# Patient Record
Sex: MALE | Race: WHITE | Employment: FULL TIME | URBAN - METROPOLITAN AREA
[De-identification: names, ages, dates, MRNs, and addresses within clinical notes are randomized per-mention and may not be internally consistent; named-entity substitution may affect disease eponyms.]

---

## 2018-01-08 ENCOUNTER — TELEPHONE (OUTPATIENT)
Dept: GASTROENTEROLOGY | Age: 53
End: 2018-01-08

## 2018-01-08 DIAGNOSIS — Z12.11 COLON CANCER SCREENING: Primary | ICD-10-CM

## 2018-01-10 PROBLEM — Z12.11 COLON CANCER SCREENING: Status: ACTIVE | Noted: 2018-01-10

## 2018-01-10 RX ORDER — SODIUM, POTASSIUM,MAG SULFATES 17.5-3.13G
SOLUTION, RECONSTITUTED, ORAL ORAL
Qty: 2 BOTTLE | Refills: 0 | Status: ON HOLD | OUTPATIENT
Start: 2018-01-10 | End: 2018-01-29 | Stop reason: HOSPADM

## 2018-01-17 ENCOUNTER — TELEPHONE (OUTPATIENT)
Dept: GASTROENTEROLOGY | Age: 53
End: 2018-01-17

## 2018-01-29 ENCOUNTER — HOSPITAL ENCOUNTER (OUTPATIENT)
Age: 53
Setting detail: OUTPATIENT SURGERY
Discharge: HOME | End: 2018-01-29
Attending: INTERNAL MEDICINE | Admitting: INTERNAL MEDICINE
Payer: COMMERCIAL

## 2018-01-29 ENCOUNTER — ANESTHESIA EVENT (OUTPATIENT)
Dept: OPERATING ROOM | Age: 53
End: 2018-01-29
Payer: COMMERCIAL

## 2018-01-29 ENCOUNTER — ANESTHESIA (OUTPATIENT)
Dept: OPERATING ROOM | Age: 53
End: 2018-01-29
Payer: COMMERCIAL

## 2018-01-29 VITALS
RESPIRATION RATE: 17 BRPM | SYSTOLIC BLOOD PRESSURE: 107 MMHG | OXYGEN SATURATION: 97 % | DIASTOLIC BLOOD PRESSURE: 67 MMHG

## 2018-01-29 VITALS
OXYGEN SATURATION: 99 % | DIASTOLIC BLOOD PRESSURE: 76 MMHG | RESPIRATION RATE: 16 BRPM | BODY MASS INDEX: 23.62 KG/M2 | HEART RATE: 63 BPM | HEIGHT: 75 IN | SYSTOLIC BLOOD PRESSURE: 106 MMHG | WEIGHT: 190 LBS | TEMPERATURE: 97.1 F

## 2018-01-29 PROCEDURE — 2580000003 HC RX 258: Performed by: INTERNAL MEDICINE

## 2018-01-29 PROCEDURE — 6360000002 HC RX W HCPCS: Performed by: NURSE ANESTHETIST, CERTIFIED REGISTERED

## 2018-01-29 PROCEDURE — 2500000003 HC RX 250 WO HCPCS: Performed by: NURSE ANESTHETIST, CERTIFIED REGISTERED

## 2018-01-29 PROCEDURE — 3700000001 HC ADD 15 MINUTES (ANESTHESIA): Performed by: INTERNAL MEDICINE

## 2018-01-29 PROCEDURE — 7100000010 HC PHASE II RECOVERY - FIRST 15 MIN: Performed by: INTERNAL MEDICINE

## 2018-01-29 PROCEDURE — 7100000011 HC PHASE II RECOVERY - ADDTL 15 MIN: Performed by: INTERNAL MEDICINE

## 2018-01-29 PROCEDURE — 3700000000 HC ANESTHESIA ATTENDED CARE: Performed by: INTERNAL MEDICINE

## 2018-01-29 PROCEDURE — 45378 DIAGNOSTIC COLONOSCOPY: CPT | Performed by: INTERNAL MEDICINE

## 2018-01-29 PROCEDURE — 3609027000 HC COLONOSCOPY: Performed by: INTERNAL MEDICINE

## 2018-01-29 RX ORDER — OMEPRAZOLE 10 MG/1
10 CAPSULE, DELAYED RELEASE ORAL EVERY MORNING
COMMUNITY

## 2018-01-29 RX ORDER — LIDOCAINE HYDROCHLORIDE 10 MG/ML
INJECTION, SOLUTION INFILTRATION; PERINEURAL PRN
Status: DISCONTINUED | OUTPATIENT
Start: 2018-01-29 | End: 2018-01-29 | Stop reason: SDUPTHER

## 2018-01-29 RX ORDER — BUPROPION HYDROCHLORIDE 100 MG/1
300 TABLET ORAL EVERY MORNING
COMMUNITY

## 2018-01-29 RX ORDER — PROPOFOL 10 MG/ML
INJECTION, EMULSION INTRAVENOUS CONTINUOUS PRN
Status: DISCONTINUED | OUTPATIENT
Start: 2018-01-29 | End: 2018-01-29 | Stop reason: SDUPTHER

## 2018-01-29 RX ORDER — SODIUM CHLORIDE, SODIUM LACTATE, POTASSIUM CHLORIDE, CALCIUM CHLORIDE 600; 310; 30; 20 MG/100ML; MG/100ML; MG/100ML; MG/100ML
INJECTION, SOLUTION INTRAVENOUS CONTINUOUS
Status: DISCONTINUED | OUTPATIENT
Start: 2018-01-29 | End: 2018-01-29 | Stop reason: HOSPADM

## 2018-01-29 RX ADMIN — SODIUM CHLORIDE, POTASSIUM CHLORIDE, SODIUM LACTATE AND CALCIUM CHLORIDE: 600; 310; 30; 20 INJECTION, SOLUTION INTRAVENOUS at 08:55

## 2018-01-29 RX ADMIN — PROPOFOL 140 MCG/KG/MIN: 10 INJECTION, EMULSION INTRAVENOUS at 09:31

## 2018-01-29 RX ADMIN — LIDOCAINE HYDROCHLORIDE 60 MG: 10 INJECTION, SOLUTION INFILTRATION; PERINEURAL at 09:31

## 2018-01-29 ASSESSMENT — PAIN SCALES - GENERAL
PAINLEVEL_OUTOF10: 0

## 2018-01-29 NOTE — ANESTHESIA PRE PROCEDURE
Pulse: 67   Resp: 16   Temp: 36.4 °C (97.6 °F)   TempSrc: Temporal   SpO2: 96%   Weight: 190 lb (86.2 kg)   Height: 6' 3\" (1.905 m)                                              BP Readings from Last 3 Encounters:   01/29/18 (!) 125/95   04/27/15 122/79       NPO Status: Time of last liquid consumption: 0430                        Time of last solid consumption: 1030                        Date of last liquid consumption: 01/29/18                        Date of last solid food consumption: 01/28/18    BMI:   Wt Readings from Last 3 Encounters:   01/29/18 190 lb (86.2 kg)   04/27/15 185 lb (83.9 kg)   04/23/15 185 lb (83.9 kg)     Body mass index is 23.75 kg/m². CBC:   Lab Results   Component Value Date    WBC 8.0 06/09/2016    RBC 5.04 06/09/2016    HGB 14.8 06/09/2016    HCT 45.2 06/09/2016    MCV 89.8 06/09/2016    RDW 14.3 06/09/2016     06/09/2016       CMP:   Lab Results   Component Value Date     04/21/2015    K 4.3 04/21/2015     04/21/2015    CO2 29 04/21/2015    BUN 18 04/21/2015    CREATININE 1.10 04/21/2015    GFRAA >60 04/21/2015    LABGLOM >60 04/21/2015    GLUCOSE 93 04/21/2015    CALCIUM 8.9 04/21/2015       POC Tests: No results for input(s): POCGLU, POCNA, POCK, POCCL, POCBUN, POCHEMO, POCHCT in the last 72 hours.     Coags: No results found for: PROTIME, INR, APTT    HCG (If Applicable): No results found for: PREGTESTUR, PREGSERUM, HCG, HCGQUANT     ABGs: No results found for: PHART, PO2ART, YEG3ZFE, TNV4IUT, BEART, H5ALYHBX     Type & Screen (If Applicable):  No results found for: LABABO, LABRH    Anesthesia Evaluation   no history of anesthetic complications:   Airway: Mallampati: II  TM distance: >3 FB   Neck ROM: full  Mouth opening: > = 3 FB Dental: normal exam         Pulmonary:normal exam  breath sounds clear to auscultation  (+) sleep apnea: on noncompliant,                             Cardiovascular:  Exercise tolerance: good (>4 METS),

## 2018-01-29 NOTE — H&P
History and Physical    Patient's Name/Date of Birth: Krista Leahy / 1965 (50 y.o.)    Date: January 29, 2018     CHIEF COMPLAINT:  screening for colon cancer     Past Medical History:   Diagnosis Date    Arthritis     Depression     Reflux     Sleep apnea      Past Surgical History:   Procedure Laterality Date    FOOT SURGERY Right     toenail removed great toe    KNEE ARTHROSCOPY Left 4/27/15    VASECTOMY      WISDOM TOOTH EXTRACTION       Current Facility-Administered Medications   Medication Dose Route Frequency Provider Last Rate Last Dose    lactated ringers infusion   Intravenous Continuous Claude Poll,  mL/hr at 01/29/18 0855       No Known Allergies  History reviewed. No pertinent family history. Social History     Social History    Marital status:      Spouse name: N/A    Number of children: N/A    Years of education: N/A     Occupational History    Not on file. Social History Main Topics    Smoking status: Current Some Day Smoker     Packs/day: 1.00     Types: Cigars    Smokeless tobacco: Never Used      Comment: every couple days    Alcohol use No    Drug use: Unknown    Sexual activity: Not on file     Other Topics Concern    Not on file     Social History Narrative    No narrative on file     ROS: Non-contributory    Physical Exam:  Vitals:    01/29/18 0845   BP: (!) 125/95   Pulse: 67   Resp: 16   Temp: 97.6 °F (36.4 °C)   SpO2: 96%       Chest: Breath sounds were clear and equal with no rales, wheezes, or rhonchi. Respiratory effort was normal with no retractions or use of accessory muscles. Cardiovascular: Heart sounds were normal with a regular rate and rhythm. There were no murmurs, gallops or rubs. Abdomen: Bowel sounds were normal.  The abdomen was soft and non distended. There was no tenderness, guarding, rebound, or rigidity. There were no masses, hepatosplenomegaly, or hernias.     Plan: Colonoscopy      Electronically by Nicolette Brasher
Detail Level: Detailed
Quality 110: Preventive Care And Screening: Influenza Immunization: Influenza Immunization Administered during Influenza season

## 2018-04-11 PROBLEM — Z12.11 COLON CANCER SCREENING: Status: RESOLVED | Noted: 2018-01-10 | Resolved: 2018-04-11

## 2021-05-21 ENCOUNTER — HOSPITAL ENCOUNTER (OUTPATIENT)
Dept: LAB | Age: 56
Setting detail: SPECIMEN
Discharge: HOME | End: 2021-05-21
Payer: COMMERCIAL

## 2021-05-21 DIAGNOSIS — Z01.818 PREOPERATIVE TESTING: Primary | ICD-10-CM

## 2021-05-21 DIAGNOSIS — Z01.818 PREOPERATIVE TESTING: ICD-10-CM

## 2021-05-21 PROCEDURE — C9803 HOPD COVID-19 SPEC COLLECT: HCPCS

## 2021-05-21 PROCEDURE — U0003 INFECTIOUS AGENT DETECTION BY NUCLEIC ACID (DNA OR RNA); SEVERE ACUTE RESPIRATORY SYNDROME CORONAVIRUS 2 (SARS-COV-2) (CORONAVIRUS DISEASE [COVID-19]), AMPLIFIED PROBE TECHNIQUE, MAKING USE OF HIGH THROUGHPUT TECHNOLOGIES AS DESCRIBED BY CMS-2020-01-R: HCPCS

## 2021-05-21 PROCEDURE — U0005 INFEC AGEN DETEC AMPLI PROBE: HCPCS

## 2021-05-22 LAB
SARS-COV-2: NORMAL
SARS-COV-2: NOT DETECTED
SOURCE: NORMAL

## 2021-05-23 ENCOUNTER — TELEPHONE (OUTPATIENT)
Dept: PRIMARY CARE CLINIC | Age: 56
End: 2021-05-23

## 2021-05-27 ENCOUNTER — HOSPITAL ENCOUNTER (OUTPATIENT)
Age: 56
Discharge: HOME | End: 2021-05-29
Payer: COMMERCIAL

## 2021-05-27 ENCOUNTER — HOSPITAL ENCOUNTER (OUTPATIENT)
Dept: PULMONOLOGY | Age: 56
Discharge: HOME | End: 2021-05-27
Payer: COMMERCIAL

## 2021-05-27 ENCOUNTER — HOSPITAL ENCOUNTER (OUTPATIENT)
Dept: GENERAL RADIOLOGY | Age: 56
Discharge: HOME | End: 2021-05-29
Payer: COMMERCIAL

## 2021-05-27 DIAGNOSIS — R06.02 BREATH SHORTNESS: ICD-10-CM

## 2021-05-27 DIAGNOSIS — R06.00 DYSPNEA, UNSPECIFIED TYPE: ICD-10-CM

## 2021-05-27 PROCEDURE — 94726 PLETHYSMOGRAPHY LUNG VOLUMES: CPT

## 2021-05-27 PROCEDURE — 94664 DEMO&/EVAL PT USE INHALER: CPT

## 2021-05-27 PROCEDURE — 71046 X-RAY EXAM CHEST 2 VIEWS: CPT

## 2021-05-27 PROCEDURE — 94060 EVALUATION OF WHEEZING: CPT

## 2021-05-27 PROCEDURE — 94729 DIFFUSING CAPACITY: CPT

## 2021-05-27 PROCEDURE — 6370000000 HC RX 637 (ALT 250 FOR IP): Performed by: INTERNAL MEDICINE

## 2021-05-27 RX ORDER — ALBUTEROL SULFATE 90 UG/1
4 AEROSOL, METERED RESPIRATORY (INHALATION) ONCE
Status: COMPLETED | OUTPATIENT
Start: 2021-05-27 | End: 2021-05-27

## 2021-05-27 RX ADMIN — ALBUTEROL SULFATE 4 PUFF: 90 AEROSOL, METERED RESPIRATORY (INHALATION) at 07:53

## 2021-06-09 NOTE — PROCEDURES
PULMONARY FUNCTION TEST      DATE 2021    COMPONENTS  Following components of the pulmonary function tests were performed during this study:    [x] Spirometry with the Forced Vital Capacity maneuver   [x] With pre-and post- bronchodilator challenge  [] Without pre-and post- bronchodilator challenge  [x] Flow-volume loop  [x] Lung volumes (Body plethysmography, when applicable)  [x] Airway resistance  [x] Diffusion capacity  [] Resting oxygen saturation  [] Maximum inspiratory and expiratory pressures    QUALITY  Based on technician observations and review of the raw data, shows that the study is of  [x] Good quality and seem to reflect true performance capacity as  [x] Patient demonstrated good effort and cooperation  [x] Test met the ATS standard for acceptability and repeatability    [] Marginal or poor quality and may not reflect true performance capacity as  [] ATS standard of \"END OF TEST\" was not met  [] Patient demonstrated hesitancy, poor effort and cooperation  [] Patient was unable to keep mouth closed on mouthpiece to maintain seal  [] Unable to perform body plethysmography  [] Unable to perform DLCO maneuver    MEASUREMENTS  FVC: 5.01 L (85% predicted), decreased to 4.93 L (84% predicted), -1% change after bronchodilator  FEV1: 3.76 L (83% predicted), improved to 3.96 L (88%), +5% after bronchodilator  FEV1/FVC: 80%  T.81 L (85% predicted)  RV: 1.77 L (74% predicted)  RV/T%  DLCO: 39.07 mL/min/mmHg (92% predicted)    SPIROMETRY       [x]   NORMAL     []   OBSTRUCTIVE VENTILATORY IMPAIRMENT     []   SUGGESTS RESTRICTIVE IMPAIRMENT       []   SUGGESTS SMALL AIRWAY DISEASE     BRONCHODILATOR RESPONSE    [x]   No significant response    < 8% improvement in FEV1     []   Borderline response  9 to 11% improvement in FEV1   or    at least 200 mL increase in FEV1     []   Significant response  > 11% improvement in FEV1   and    at least 200 mL increase in FEV1     FLOW-VOLUME LOOP PATTERN    [x]   NORMAL     []   OBSTRUCTIVE      []   RESTRICTIVE      []   VARIABLE- EXTRATHORACIC     []   VARIABLE- INTRATHORACIC     []   FIXED OBSTRUCTIVE      []   SAWTOOTH      LUNG VOLUMES/BODY PLETHYSMOGRAPHY     TOTAL LUNG CAPACITY  [x] NORMAL    [] AT LOWER LIMIT OF NORMAL    [] REDUCED (CONFIRMS RESTRICTION) < 80 % predicted   [] INCREASED (SUGGESTIVE OF HYPERINFLATION) > 120 % predicted   [] UNABLE TO PERFORM      RESIDUAL VOLUME or RV/TLC  [] NORMAL    [x] AT LOWER LIMIT OF NORMAL    [] REDUCED (CONFIRMS RESTRICTION) < 80%   [] INCREASED (SUGGESTIVE OF AIR TRAPPING) > 120 %     EXPIRATORY RESERVE VOLUME  [x] NORMAL   [] REDUCED (CONSISTENT WITH BODY HABITUS/OBESITY)     DIFFUSION CAPACITY       DLCO (% predicted)   [x] NORMAL    [] NORMAL (WHEN CORRECTED FOR ALVEOLAR VOLUME)    [] MILD REDUCTION > 60 % and < 75 %   [] MODERATE REDUCTION 40 to 60 %   [] SEVERE REDUCTION < 40 %   [] INCREASED > 125 %     FINAL IMPRESSION     The overall study shows normal spirometry, lung volumes and diffusion capacity. COMMENTS  Please correlate clinically.

## 2021-06-10 NOTE — PROCEDURES
[address]                               PULMONARY FUNCTION    PATIENT NAME: Lane Rosenberg                      :        1965  MED REC NO:   [id number]                              ROOM:  ACCOUNT NO:   [de-identified]                           ADMIT DATE: 2021  PROVIDER:     Ellie Freire    DATE OF PROCEDURE:  2021    FINDINGS:  The patient's spirometry is normal.  FEV1 83% of predicted  with 5% bronchodilator change to 88% of predicted. FVC 85% of predicted  with no positive bronchodilator change. FEV1-to-FVC ratio is 75,  postbronchodilator 80. Total lung capacity 84% of predicted via body box. RV 74% of predicted. Diffusion capacity 92% of predicted, corrected for alveolar volume 95%  of predicted. Airway resistance normal.    FINAL IMPRESSION:  This study shows normal spirometry. There is mild  decrease in residual volume; otherwise, total lung capacity is normal  and diffusion capacity is normal.  Clinical correlation advised. DARWIN STEVE    D: 2021 22:55:07       T: 2021 23:01:35     [email or web address]  Job#: [id number]     Doc#: [id number]    CC:  Vickie Robledo RN CFNP

## 2021-11-16 ENCOUNTER — HOSPITAL ENCOUNTER (OUTPATIENT)
Dept: NON INVASIVE DIAGNOSTICS | Age: 56
Discharge: HOME | End: 2021-11-16
Payer: COMMERCIAL

## 2021-11-16 DIAGNOSIS — R06.09 EXERTIONAL DYSPNEA: ICD-10-CM

## 2021-11-16 LAB
LV EF: 60 %
LVEF MODALITY: NORMAL

## 2021-11-16 PROCEDURE — A9500 TC99M SESTAMIBI: HCPCS | Performed by: NURSE PRACTITIONER

## 2021-11-16 PROCEDURE — 93017 CV STRESS TEST TRACING ONLY: CPT

## 2021-11-16 PROCEDURE — 93306 TTE W/DOPPLER COMPLETE: CPT

## 2021-11-16 PROCEDURE — 3430000000 HC RX DIAGNOSTIC RADIOPHARMACEUTICAL: Performed by: NURSE PRACTITIONER

## 2021-11-16 RX ADMIN — Medication 30 MILLICURIE: at 09:57

## 2021-11-17 ENCOUNTER — HOSPITAL ENCOUNTER (OUTPATIENT)
Dept: NON INVASIVE DIAGNOSTICS | Age: 56
Discharge: HOME | End: 2021-11-17
Payer: COMMERCIAL

## 2021-11-17 PROCEDURE — 3430000000 HC RX DIAGNOSTIC RADIOPHARMACEUTICAL: Performed by: NURSE PRACTITIONER

## 2021-11-17 PROCEDURE — 78452 HT MUSCLE IMAGE SPECT MULT: CPT

## 2021-11-17 PROCEDURE — A9500 TC99M SESTAMIBI: HCPCS | Performed by: NURSE PRACTITIONER

## 2021-11-17 RX ADMIN — Medication 30 MILLICURIE: at 13:00

## 2021-11-18 NOTE — PROCEDURES
[facility], [address] [place] [address]                              CARDIAC STRESS TEST    PATIENT NAME: Laurier Fothergill                      :        1965  MED REC NO:   [id number]                              ROOM:  ACCOUNT NO:   [de-identified]                           ADMIT DATE: 2021  PROVIDER:     Ortega Her    CARDIOVASCULAR DIAGNOSTIC DEPARTMENT    DATE OF STUDY:  2021    ORDERING PROVIDER:  Sarabjit Robledo NP    PRIMARY CARE PROVIDER:  Vickie Robledo NP    INTERPRETING PHYSICIAN:  Abigail Turner. Jan Servin MD    EXERCISE MYOCARDIAL PERFUSION STRESS TEST REPORT    Stress/Rest single-isotope SPECT imaging with exercise stress and gated  SPECT imaging    INDICATION:  Assessment of a cardiac cause of:  Dyspnea. CLINICAL HISTORY:  The patient is a 82-year-old man with no known  coronary artery disease. Previous cardiac history includes:  Stress test.    Other previous history includes:  Chest pain, palpitations, dyspnea,  diabetes mellitus. Symptoms just prior to testing included:  None. Relevant medications:  None. PROCEDURE:  The patient performed treadmill exercise using a Juve  protocol, completing 12:00 minutes and completing an estimated workload  of 04.43 metabolic equivalents (METS). The test was terminated due to fatigue and shortness of breath. The heart rate was 76 beats per minute at baseline and increased to 166  beats per minute at peak exercise, which was 99% of the maximum  predicted heart rate. The rest blood pressure was 128/84 mmHg and  increased to 160/80 mmHg, which is a normal response. During the  procedure, the patient developed fatigue, shortness of breath and leg  fatigue, but denied any chest discomfort. Myocardial perfusion imaging: Imaging was performed at rest 30-45  minutes following the injection of 30 mCi of sestamibi.   At peak  exercise, the patient was injected with 30 mCi of sestamibi and exercise  was continued for 1 minute. Gating post-stress tomographic imaging was  performed 30-45 minutes after stress. STRESS ECG RESULTS:  The resting electrocardiogram demonstrated normal  sinus rhythm without definitive ST-segment abnormalities suggestive of  myocardial ischemia. At peak exercise and during recovery, the patient developed:    Upsloping ST segment changes in leads II, III, AVF, V4, V5, V6, which  did meet diagnostic criteria for myocardial ischemia with no premature  atrial contractions (PACs) and no premature ventricular contractions  (PVCs). NUCLEAR IMAGING RESULTS:  The overall quality of the study is fair. No  significant attenuation artifact was seen. There is no evidence of  abnormal lung uptake. Additionally, the right ventricle appears normal.  The left ventricular cavity is noted to be normal in size on the stress  images. There is no evidence of transient ischemic dilatation (TID) of  the left ventricle. Gated SPECT imaging reveals normal myocardial thickening and wall motion  with a calculated left ventricular ejection fraction of 69%. The rest images demonstrate homogeneous tracer distribution throughout  the myocardium. SPECT images demonstrate homogeneous tracer distribution throughout the  myocardium. IMPRESSION:  1. Normal myocardial perfusion imaging, without significant evidence of  myocardial ischemia or infarction. 2.  Global left ventricular systolic function was normal with an  ejection fraction of 69%, without regional wall motion abnormalities. The patient's Duke Treadmill score is 7, which correlates with a low  risk for significant coronary artery disease. Overall, these results are most consistent with a low risk scan. JYOTI Villatoro    D: 11/18/2021 9:38:48       T: 11/18/2021 9:42:25     SHERWIN/BETSEY  Job#: [id number]     Doc#: Unknown    CC:  ZACARIAS Judge

## 2022-11-29 ENCOUNTER — APPOINTMENT (OUTPATIENT)
Dept: CT IMAGING | Age: 57
End: 2022-11-29
Payer: COMMERCIAL

## 2022-11-29 ENCOUNTER — APPOINTMENT (OUTPATIENT)
Dept: GENERAL RADIOLOGY | Age: 57
End: 2022-11-29
Payer: COMMERCIAL

## 2022-11-29 ENCOUNTER — HOSPITAL ENCOUNTER (EMERGENCY)
Age: 57
Discharge: ANOTHER HOSPITAL | End: 2022-11-29
Attending: EMERGENCY MEDICINE
Payer: COMMERCIAL

## 2022-11-29 VITALS
DIASTOLIC BLOOD PRESSURE: 73 MMHG | TEMPERATURE: 98.4 F | OXYGEN SATURATION: 95 % | HEART RATE: 78 BPM | RESPIRATION RATE: 17 BRPM | WEIGHT: 190 LBS | BODY MASS INDEX: 23.75 KG/M2 | SYSTOLIC BLOOD PRESSURE: 121 MMHG

## 2022-11-29 DIAGNOSIS — I21.4 NON-STEMI (NON-ST ELEVATED MYOCARDIAL INFARCTION) (HCC): Primary | ICD-10-CM

## 2022-11-29 LAB
ABSOLUTE EOS #: 0.12 K/UL (ref 0–0.44)
ABSOLUTE IMMATURE GRANULOCYTE: <0.03 K/UL (ref 0–0.3)
ABSOLUTE LYMPH #: 1.88 K/UL (ref 1.1–3.7)
ABSOLUTE MONO #: 0.64 K/UL (ref 0.1–1.2)
ANION GAP SERPL CALCULATED.3IONS-SCNC: 11 MMOL/L (ref 9–17)
BASOPHILS # BLD: 2 % (ref 0–2)
BASOPHILS ABSOLUTE: 0.12 K/UL (ref 0–0.2)
BUN BLDV-MCNC: 16 MG/DL (ref 6–20)
BUN/CREAT BLD: 16 (ref 9–20)
CALCIUM SERPL-MCNC: 9 MG/DL (ref 8.6–10.4)
CHLORIDE BLD-SCNC: 100 MMOL/L (ref 98–107)
CO2: 24 MMOL/L (ref 20–31)
CREAT SERPL-MCNC: 1.03 MG/DL (ref 0.7–1.2)
EKG ATRIAL RATE: 85 BPM
EKG P AXIS: 59 DEGREES
EKG P-R INTERVAL: 146 MS
EKG Q-T INTERVAL: 326 MS
EKG QRS DURATION: 78 MS
EKG QTC CALCULATION (BAZETT): 387 MS
EKG R AXIS: 49 DEGREES
EKG T AXIS: 88 DEGREES
EKG VENTRICULAR RATE: 85 BPM
EOSINOPHILS RELATIVE PERCENT: 2 % (ref 1–4)
GFR SERPL CREATININE-BSD FRML MDRD: >60 ML/MIN/1.73M2
GLUCOSE BLD-MCNC: 105 MG/DL (ref 70–99)
HCT VFR BLD CALC: 38.2 % (ref 40.7–50.3)
HEMOGLOBIN: 12.6 G/DL (ref 13–17)
IMMATURE GRANULOCYTES: 0 %
LYMPHOCYTES # BLD: 31 % (ref 24–43)
MCH RBC QN AUTO: 28.1 PG (ref 25.2–33.5)
MCHC RBC AUTO-ENTMCNC: 33 G/DL (ref 28.4–34.8)
MCV RBC AUTO: 85.1 FL (ref 82.6–102.9)
MONOCYTES # BLD: 11 % (ref 3–12)
NRBC AUTOMATED: 0 PER 100 WBC
PARTIAL THROMBOPLASTIN TIME: 28.7 SEC (ref 26.8–34.8)
PDW BLD-RTO: 14.3 % (ref 11.8–14.4)
PLATELET # BLD: 326 K/UL (ref 138–453)
PMV BLD AUTO: 9.3 FL (ref 8.1–13.5)
POTASSIUM SERPL-SCNC: 4.1 MMOL/L (ref 3.7–5.3)
RBC # BLD: 4.49 M/UL (ref 4.21–5.77)
SEG NEUTROPHILS: 54 % (ref 36–65)
SEGMENTED NEUTROPHILS ABSOLUTE COUNT: 3.3 K/UL (ref 1.5–8.1)
SODIUM BLD-SCNC: 135 MMOL/L (ref 135–144)
TROPONIN, HIGH SENSITIVITY: 206 NG/L (ref 0–22)
TROPONIN, HIGH SENSITIVITY: 214 NG/L (ref 0–22)
WBC # BLD: 6.1 K/UL (ref 3.5–11.3)

## 2022-11-29 PROCEDURE — 96366 THER/PROPH/DIAG IV INF ADDON: CPT

## 2022-11-29 PROCEDURE — 99285 EMERGENCY DEPT VISIT HI MDM: CPT

## 2022-11-29 PROCEDURE — 6360000002 HC RX W HCPCS: Performed by: EMERGENCY MEDICINE

## 2022-11-29 PROCEDURE — 80048 BASIC METABOLIC PNL TOTAL CA: CPT

## 2022-11-29 PROCEDURE — 85730 THROMBOPLASTIN TIME PARTIAL: CPT

## 2022-11-29 PROCEDURE — 96365 THER/PROPH/DIAG IV INF INIT: CPT

## 2022-11-29 PROCEDURE — 93005 ELECTROCARDIOGRAM TRACING: CPT | Performed by: EMERGENCY MEDICINE

## 2022-11-29 PROCEDURE — 36415 COLL VENOUS BLD VENIPUNCTURE: CPT

## 2022-11-29 PROCEDURE — 84484 ASSAY OF TROPONIN QUANT: CPT

## 2022-11-29 PROCEDURE — 96376 TX/PRO/DX INJ SAME DRUG ADON: CPT

## 2022-11-29 PROCEDURE — 93010 ELECTROCARDIOGRAM REPORT: CPT | Performed by: INTERNAL MEDICINE

## 2022-11-29 PROCEDURE — 74174 CTA ABD&PLVS W/CONTRAST: CPT

## 2022-11-29 PROCEDURE — 6360000004 HC RX CONTRAST MEDICATION: Performed by: EMERGENCY MEDICINE

## 2022-11-29 PROCEDURE — 85025 COMPLETE CBC W/AUTO DIFF WBC: CPT

## 2022-11-29 PROCEDURE — 71045 X-RAY EXAM CHEST 1 VIEW: CPT

## 2022-11-29 RX ORDER — HEPARIN SODIUM 1000 [USP'U]/ML
80 INJECTION, SOLUTION INTRAVENOUS; SUBCUTANEOUS PRN
Status: DISCONTINUED | OUTPATIENT
Start: 2022-11-29 | End: 2022-11-30 | Stop reason: HOSPADM

## 2022-11-29 RX ORDER — HEPARIN SODIUM 10000 [USP'U]/100ML
5-30 INJECTION, SOLUTION INTRAVENOUS CONTINUOUS
Status: DISCONTINUED | OUTPATIENT
Start: 2022-11-29 | End: 2022-11-30 | Stop reason: HOSPADM

## 2022-11-29 RX ORDER — HEPARIN SODIUM 1000 [USP'U]/ML
40 INJECTION, SOLUTION INTRAVENOUS; SUBCUTANEOUS PRN
Status: DISCONTINUED | OUTPATIENT
Start: 2022-11-29 | End: 2022-11-30 | Stop reason: HOSPADM

## 2022-11-29 RX ORDER — HEPARIN SODIUM 1000 [USP'U]/ML
80 INJECTION, SOLUTION INTRAVENOUS; SUBCUTANEOUS ONCE
Status: COMPLETED | OUTPATIENT
Start: 2022-11-29 | End: 2022-11-29

## 2022-11-29 RX ADMIN — IOPAMIDOL 100 ML: 755 INJECTION, SOLUTION INTRAVENOUS at 14:41

## 2022-11-29 RX ADMIN — HEPARIN SODIUM 6900 UNITS: 1000 INJECTION INTRAVENOUS; SUBCUTANEOUS at 18:25

## 2022-11-29 RX ADMIN — HEPARIN SODIUM AND DEXTROSE 18 UNITS/KG/HR: 10000; 5 INJECTION INTRAVENOUS at 18:30

## 2022-11-29 ASSESSMENT — PAIN DESCRIPTION - ORIENTATION: ORIENTATION: LEFT

## 2022-11-29 ASSESSMENT — PAIN SCALES - GENERAL
PAINLEVEL_OUTOF10: 3
PAINLEVEL_OUTOF10: 4

## 2022-11-29 ASSESSMENT — PAIN DESCRIPTION - FREQUENCY
FREQUENCY: CONTINUOUS
FREQUENCY: INTERMITTENT

## 2022-11-29 ASSESSMENT — HEART SCORE: ECG: 1

## 2022-11-29 ASSESSMENT — PAIN - FUNCTIONAL ASSESSMENT
PAIN_FUNCTIONAL_ASSESSMENT: 0-10
PAIN_FUNCTIONAL_ASSESSMENT: NONE - DENIES PAIN
PAIN_FUNCTIONAL_ASSESSMENT: NONE - DENIES PAIN

## 2022-11-29 ASSESSMENT — PAIN DESCRIPTION - PAIN TYPE: TYPE: ACUTE PAIN

## 2022-11-29 ASSESSMENT — PAIN DESCRIPTION - LOCATION
LOCATION: CHEST
LOCATION: CHEST

## 2022-11-29 ASSESSMENT — PAIN DESCRIPTION - DESCRIPTORS
DESCRIPTORS: DISCOMFORT;SQUEEZING
DESCRIPTORS: SQUEEZING

## 2022-11-29 NOTE — ED PROVIDER NOTES
[address] [facility] Emergency Department ED  EMERGENCY DEPARTMENT ENCOUNTER      Pt Name: Janelle Lopez  MRN: [id number]  [place] 1965  Date of evaluation: 11/29/2022  Provider: Chelo Carrasco MD    [address] [place] [address]       Chief Complaint   Patient presents with    Other    Chest Pain     Intermittent chest pain since friday         HISTORY OF PRESENT ILLNESS   (Location/Symptom, Timing/Onset, Context/Setting, Quality, Duration, Modifying Factors, Severity)  Note limiting factors. Janelle Lopez is a 49 y.o. male who presents to the emergency department     For 5year-old male presented emergency department for evaluation of intermittent chest pain. Patient states that early Saturday morning he had an episode of chest pain that he states is anterior tightness wrapped around his chest radiated up towards his neck and his jaw and he also had some discomfort in his bilateral arms. Pain lasted some time but did resolve Saturday morning. Has had intermittent episodes of chest discomfort since then. Still is complaining of some left-sided jaw pain as well. He did have some dental issues and was seen by a dentist who felt that his jaw pain was not secondary to his current dental issues. Patient denies any fevers or chills. He does not currently have any upper back pain. No abdominal pain. No extremity weakness. Thing taken at home for relief. Patient does not smoke cigarettes. Denies any previous cardiac history no significant medical illnesses. No other acute concerns      Nursing Notes were reviewed. REVIEW OF SYSTEMS    (2-9 systems for level 4, 10 or more for level 5)     Review of Systems   All other systems reviewed and are negative. Except as noted above the remainder of the review of systems was reviewed and negative.        PAST MEDICAL HISTORY     Past Medical History:   Diagnosis Date    Arthritis     Depression     Reflux     Sleep apnea          SURGICAL HISTORY       Past Surgical History:   Procedure Laterality Date    COLONOSCOPY  01/29/2018    -hemorrhoids    FOOT SURGERY Right     toenail removed great toe    KNEE ARTHROSCOPY Left 4/27/15    HI COLON CA SCRN NOT HI RSK IND N/A 1/29/2018    COLONOSCOPY performed by Sheryl Rodriguez MD at [address] [place] [address]       Previous Medications    BUPROPION (WELLBUTRIN) 100 MG TABLET    Take 300 mg by mouth every morning    OMEPRAZOLE (PRILOSEC) 10 MG DELAYED RELEASE CAPSULE    Take 10 mg by mouth every morning    VITAMIN D (CHOLECALCIFEROL) 1000 UNIT TABS TABLET    Take 1,000 Units by mouth daily       ALLERGIES     Patient has no known allergies. FAMILY HISTORY     History reviewed. No pertinent family history. SOCIAL HISTORY       Social History     Socioeconomic History    Marital status:      Spouse name: None    Number of children: None    Years of education: None    Highest education level: None   Tobacco Use    Smoking status: Some Days     Packs/day: 1.00     Types: Cigars, Cigarettes    Smokeless tobacco: Never    Tobacco comments:     every couple days   Substance and Sexual Activity    Alcohol use: No       SCREENINGS        Inga Coma Scale  Eye Opening: Spontaneous  Best Verbal Response: Oriented  Best Motor Response: Obeys commands  Cary Coma Scale Score: 15 Heart Score for chest pain patients  History: Moderately Suspicious  ECG: Non-Specifc repolarization disturbance/LBTB/PM  Patient Age: > 45 and < 65 years  Risk Factors: 1 or 2 risk factors  Troponin: > 3X normal limit  Heart Score Total: 6             PHYSICAL EXAM    (up to 7 for level 4, 8 or more for level 5)     ED Triage Vitals [11/29/22 1333]   BP Temp Temp Source Heart Rate Resp SpO2 Height Weight   (!) 126/103 98.4 °F (36.9 °C) Tympanic 96 16 98 % -- --       Physical Exam  Vitals and nursing note reviewed. HENT:      Head: Normocephalic and atraumatic.    Eyes:      Conjunctiva/sclera: Conjunctivae normal.   Cardiovascular:      Rate and Rhythm: Normal rate and regular rhythm. Pulmonary:      Effort: Pulmonary effort is normal. No respiratory distress. Breath sounds: Normal breath sounds. Abdominal:      General: There is no distension. Palpations: Abdomen is soft. Tenderness: There is no abdominal tenderness. Musculoskeletal:         General: Normal range of motion. Cervical back: Normal range of motion. Skin:     General: Skin is warm and dry. Findings: No rash. Neurological:      General: No focal deficit present. Mental Status: He is alert and oriented to person, place, and time. DIAGNOSTIC RESULTS     EKG: All EKG's are interpreted by the Emergency Department Physician who either signs or Co-signs this chart in the absence of a cardiologist.    85 bpm.  Normal conduction intervals. Diffuse T wave flattening lateral and inferior leads. No acute ST segment findings. Nonspecific EKG with no acute infarction    RADIOLOGY:   Non-plain film images such as CT, Ultrasound and MRI are read by the radiologist. Plain radiographic images are visualized and preliminarily interpreted by the emergency physician with the below findings:        Interpretation per the Radiologist below, if available at the time of this note:    CTA CHEST [address]   Final Result   1. No evidence for aortic aneurysm or dissection. 2. No acute infective inflammatory process. XR CHEST PORTABLE   Final Result   No significant abnormalities detected.                ED BEDSIDE ULTRASOUND:   Performed by ED Physician - none    LABS:  Labs Reviewed   BASIC METABOLIC PANEL - Abnormal; Notable for the following components:       Result Value    Glucose 105 (*)     All other components within normal limits   CBC WITH AUTO DIFFERENTIAL - Abnormal; Notable for the following components:    Hemoglobin 12.6 (*)     Hematocrit 38.2 (*)     All other components within normal limits TROPONIN - Abnormal; Notable for the following components:    Troponin, High Sensitivity 214 (*)     All other components within normal limits   TROPONIN - Abnormal; Notable for the following components:    Troponin, High Sensitivity 206 (*)     All other components within normal limits   APTT   APTT   APTT       All other labs were within normal range or not returned as of this dictation. EMERGENCY DEPARTMENT COURSE and DIFFERENTIAL DIAGNOSIS/MDM:   Vitals:    Vitals:    11/29/22 1333 11/29/22 1549 11/29/22 1716 11/29/22 1831   BP: (!) 126/103 115/77  (!) 162/92   Pulse: 96 71  86   Resp: 16 18 22   Temp: 98.4 °F (36.9 °C)      TempSrc: Tympanic      SpO2: 98% 96%  98%   Weight:   190 lb (86.2 kg)            MDM  Number of Diagnoses or Management Options  Diagnosis management comments: Results reviewed. No significant acute findings on EKG. Initial troponin approximately 200. Repeat troponin also 200. Patient is resting comfortably without chest pain at this time. CT results were unremarkable. Heparin was initiated. Patient was discussed via telephone with hospitalist service at [facility] and was accepted for transfer. Patient remains resting comfortably chest pain-free at this. Plan of care. MIPS       REASSESSMENT          CRITICAL CARE TIME   Total Critical Care time was 30 minutes, excluding separately reportable procedures. There was a high probability of clinically significant/life threatening deterioration in the patient's condition which required my urgent intervention. CONSULTS:  None    PROCEDURES:  Unless otherwise noted below, none     Procedures        FINAL IMPRESSION      1. Non-STEMI (non-ST elevated myocardial infarction) [facility])          DISPOSITION/PLAN   DISPOSITION Decision To Transfer 11/29/2022 02:28:30 PM      PATIENT REFERRED TO:  No follow-up provider specified.     DISCHARGE MEDICATIONS:  New Prescriptions    No medications on file     Controlled Substances Monitoring:     No flowsheet data found.     (Please note that portions of this note were completed with a voice recognition program.  Efforts were made to edit the dictations but occasionally words are mis-transcribed.)    Odilon Adler MD (electronically signed)  Attending Emergency Physician             Odilon Adler MD  11/29/22 2021

## 2022-11-29 NOTE — ED NOTES
[facility] possibly has a bed available later this evening. Per Dr. Juan Handy, will connect with cardiology there.      Mario AVERY Reser  11/29/22 [id number]

## 2022-11-30 ENCOUNTER — APPOINTMENT (OUTPATIENT)
Dept: CARDIAC CATH/INVASIVE PROCEDURES | Age: 57
DRG: 282 | End: 2022-11-30
Attending: FAMILY MEDICINE
Payer: COMMERCIAL

## 2022-11-30 ENCOUNTER — HOSPITAL ENCOUNTER (INPATIENT)
Age: 57
LOS: 1 days | Discharge: HOME | DRG: 282 | End: 2022-12-01
Attending: FAMILY MEDICINE | Admitting: FAMILY MEDICINE
Payer: COMMERCIAL

## 2022-11-30 PROBLEM — G47.30 SLEEP APNEA: Status: ACTIVE | Noted: 2022-11-30

## 2022-11-30 PROBLEM — F32.A DEPRESSION: Status: ACTIVE | Noted: 2022-11-30

## 2022-11-30 PROBLEM — M19.90 ARTHRITIS: Status: ACTIVE | Noted: 2022-11-30

## 2022-11-30 PROBLEM — R73.03 PREDIABETES: Status: ACTIVE | Noted: 2022-11-30

## 2022-11-30 LAB
ANTI-XA UNFRAC HEPARIN: 0.75 IU/L (ref 0.3–0.7)
ANTI-XA UNFRAC HEPARIN: 1.1 IU/L (ref 0.3–0.7)
CHOLESTEROL/HDL RATIO: 3
CHOLESTEROL: 188 MG/DL
GLUCOSE BLD-MCNC: 100 MG/DL (ref 75–110)
GLUCOSE BLD-MCNC: 102 MG/DL (ref 75–110)
GLUCOSE BLD-MCNC: 116 MG/DL (ref 75–110)
GLUCOSE BLD-MCNC: 76 MG/DL (ref 75–110)
HDLC SERPL-MCNC: 63 MG/DL
LDL CHOLESTEROL: 116 MG/DL (ref 0–130)
TRIGL SERPL-MCNC: 43 MG/DL
TROPONIN, HIGH SENSITIVITY: 209 NG/L (ref 0–22)
TROPONIN, HIGH SENSITIVITY: 212 NG/L (ref 0–22)

## 2022-11-30 PROCEDURE — 2060000000 HC ICU INTERMEDIATE R&B

## 2022-11-30 PROCEDURE — 93306 TTE W/DOPPLER COMPLETE: CPT

## 2022-11-30 PROCEDURE — 80061 LIPID PANEL: CPT

## 2022-11-30 PROCEDURE — 84484 ASSAY OF TROPONIN QUANT: CPT

## 2022-11-30 PROCEDURE — 82947 ASSAY GLUCOSE BLOOD QUANT: CPT

## 2022-11-30 PROCEDURE — 85520 HEPARIN ASSAY: CPT

## 2022-11-30 PROCEDURE — 6360000004 HC RX CONTRAST MEDICATION

## 2022-11-30 PROCEDURE — C1894 INTRO/SHEATH, NON-LASER: HCPCS

## 2022-11-30 PROCEDURE — B2111ZZ FLUOROSCOPY OF MULTIPLE CORONARY ARTERIES USING LOW OSMOLAR CONTRAST: ICD-10-PCS | Performed by: INTERNAL MEDICINE

## 2022-11-30 PROCEDURE — 4A023N8 MEASUREMENT OF CARDIAC SAMPLING AND PRESSURE, BILATERAL, PERCUTANEOUS APPROACH: ICD-10-PCS | Performed by: INTERNAL MEDICINE

## 2022-11-30 PROCEDURE — 36415 COLL VENOUS BLD VENIPUNCTURE: CPT

## 2022-11-30 PROCEDURE — 2500000003 HC RX 250 WO HCPCS

## 2022-11-30 PROCEDURE — 6360000002 HC RX W HCPCS

## 2022-11-30 PROCEDURE — 2580000003 HC RX 258: Performed by: NURSE PRACTITIONER

## 2022-11-30 PROCEDURE — APPSS60 APP SPLIT SHARED TIME 46-60 MINUTES: Performed by: NURSE PRACTITIONER

## 2022-11-30 PROCEDURE — 2709999900 HC NON-CHARGEABLE SUPPLY

## 2022-11-30 PROCEDURE — 6370000000 HC RX 637 (ALT 250 FOR IP): Performed by: INTERNAL MEDICINE

## 2022-11-30 PROCEDURE — 6370000000 HC RX 637 (ALT 250 FOR IP): Performed by: NURSE PRACTITIONER

## 2022-11-30 PROCEDURE — 6360000002 HC RX W HCPCS: Performed by: NURSE PRACTITIONER

## 2022-11-30 RX ORDER — DILTIAZEM HYDROCHLORIDE 60 MG/1
120 CAPSULE, EXTENDED RELEASE ORAL DAILY
Status: DISCONTINUED | OUTPATIENT
Start: 2022-11-30 | End: 2022-12-01 | Stop reason: HOSPADM

## 2022-11-30 RX ORDER — VITAMIN B COMPLEX
1000 TABLET ORAL DAILY
Status: DISCONTINUED | OUTPATIENT
Start: 2022-11-30 | End: 2022-12-01 | Stop reason: HOSPADM

## 2022-11-30 RX ORDER — SODIUM CHLORIDE 9 MG/ML
INJECTION, SOLUTION INTRAVENOUS CONTINUOUS
Status: DISCONTINUED | OUTPATIENT
Start: 2022-11-30 | End: 2022-11-30

## 2022-11-30 RX ORDER — HEPARIN SODIUM 1000 [USP'U]/ML
60 INJECTION, SOLUTION INTRAVENOUS; SUBCUTANEOUS ONCE
Status: DISCONTINUED | OUTPATIENT
Start: 2022-11-30 | End: 2022-11-30 | Stop reason: SDUPTHER

## 2022-11-30 RX ORDER — HEPARIN SODIUM 1000 [USP'U]/ML
2000 INJECTION, SOLUTION INTRAVENOUS; SUBCUTANEOUS PRN
Status: DISCONTINUED | OUTPATIENT
Start: 2022-11-30 | End: 2022-12-01 | Stop reason: HOSPADM

## 2022-11-30 RX ORDER — SODIUM CHLORIDE 0.9 % (FLUSH) 0.9 %
5-40 SYRINGE (ML) INJECTION EVERY 12 HOURS SCHEDULED
Status: DISCONTINUED | OUTPATIENT
Start: 2022-11-30 | End: 2022-12-01 | Stop reason: HOSPADM

## 2022-11-30 RX ORDER — ONDANSETRON 4 MG/1
4 TABLET, ORALLY DISINTEGRATING ORAL EVERY 8 HOURS PRN
Status: DISCONTINUED | OUTPATIENT
Start: 2022-11-30 | End: 2022-12-01 | Stop reason: HOSPADM

## 2022-11-30 RX ORDER — SODIUM CHLORIDE 0.9 % (FLUSH) 0.9 %
10 SYRINGE (ML) INJECTION PRN
Status: DISCONTINUED | OUTPATIENT
Start: 2022-11-30 | End: 2022-12-01 | Stop reason: HOSPADM

## 2022-11-30 RX ORDER — ACETAMINOPHEN 650 MG/1
650 SUPPOSITORY RECTAL EVERY 6 HOURS PRN
Status: DISCONTINUED | OUTPATIENT
Start: 2022-11-30 | End: 2022-12-01 | Stop reason: HOSPADM

## 2022-11-30 RX ORDER — INSULIN LISPRO 100 [IU]/ML
0-4 INJECTION, SOLUTION INTRAVENOUS; SUBCUTANEOUS
Status: DISCONTINUED | OUTPATIENT
Start: 2022-11-30 | End: 2022-12-01 | Stop reason: HOSPADM

## 2022-11-30 RX ORDER — HEPARIN SODIUM 10000 [USP'U]/100ML
5-30 INJECTION, SOLUTION INTRAVENOUS CONTINUOUS
Status: DISCONTINUED | OUTPATIENT
Start: 2022-11-30 | End: 2022-11-30

## 2022-11-30 RX ORDER — BUPROPION HYDROCHLORIDE 100 MG/1
300 TABLET ORAL EVERY MORNING
Status: DISCONTINUED | OUTPATIENT
Start: 2022-11-30 | End: 2022-11-30

## 2022-11-30 RX ORDER — ASPIRIN 81 MG/1
81 TABLET, CHEWABLE ORAL DAILY
Status: DISCONTINUED | OUTPATIENT
Start: 2022-12-01 | End: 2022-12-01 | Stop reason: HOSPADM

## 2022-11-30 RX ORDER — PANTOPRAZOLE SODIUM 40 MG/1
40 TABLET, DELAYED RELEASE ORAL
Status: DISCONTINUED | OUTPATIENT
Start: 2022-11-30 | End: 2022-12-01 | Stop reason: HOSPADM

## 2022-11-30 RX ORDER — ATORVASTATIN CALCIUM 80 MG/1
80 TABLET, FILM COATED ORAL NIGHTLY
Status: DISCONTINUED | OUTPATIENT
Start: 2022-11-30 | End: 2022-12-01 | Stop reason: HOSPADM

## 2022-11-30 RX ORDER — MAGNESIUM SULFATE 1 G/100ML
1000 INJECTION INTRAVENOUS PRN
Status: DISCONTINUED | OUTPATIENT
Start: 2022-11-30 | End: 2022-12-01 | Stop reason: HOSPADM

## 2022-11-30 RX ORDER — INSULIN LISPRO 100 [IU]/ML
0-4 INJECTION, SOLUTION INTRAVENOUS; SUBCUTANEOUS NIGHTLY
Status: DISCONTINUED | OUTPATIENT
Start: 2022-11-30 | End: 2022-12-01 | Stop reason: HOSPADM

## 2022-11-30 RX ORDER — ACETAMINOPHEN 325 MG/1
650 TABLET ORAL EVERY 6 HOURS PRN
Status: DISCONTINUED | OUTPATIENT
Start: 2022-11-30 | End: 2022-12-01 | Stop reason: HOSPADM

## 2022-11-30 RX ORDER — DEXTROSE MONOHYDRATE 100 MG/ML
INJECTION, SOLUTION INTRAVENOUS CONTINUOUS PRN
Status: DISCONTINUED | OUTPATIENT
Start: 2022-11-30 | End: 2022-12-01 | Stop reason: HOSPADM

## 2022-11-30 RX ORDER — HEPARIN SODIUM 1000 [USP'U]/ML
4000 INJECTION, SOLUTION INTRAVENOUS; SUBCUTANEOUS PRN
Status: DISCONTINUED | OUTPATIENT
Start: 2022-11-30 | End: 2022-12-01 | Stop reason: HOSPADM

## 2022-11-30 RX ORDER — SODIUM CHLORIDE 9 MG/ML
INJECTION, SOLUTION INTRAVENOUS PRN
Status: DISCONTINUED | OUTPATIENT
Start: 2022-11-30 | End: 2022-12-01 | Stop reason: HOSPADM

## 2022-11-30 RX ORDER — NITROGLYCERIN 0.4 MG/1
0.4 TABLET SUBLINGUAL EVERY 5 MIN PRN
Status: DISCONTINUED | OUTPATIENT
Start: 2022-11-30 | End: 2022-12-01 | Stop reason: HOSPADM

## 2022-11-30 RX ORDER — SODIUM CHLORIDE 9 MG/ML
INJECTION, SOLUTION INTRAVENOUS CONTINUOUS
Status: DISCONTINUED | OUTPATIENT
Start: 2022-11-30 | End: 2022-12-01 | Stop reason: HOSPADM

## 2022-11-30 RX ORDER — POTASSIUM CHLORIDE 20 MEQ/1
40 TABLET, EXTENDED RELEASE ORAL PRN
Status: DISCONTINUED | OUTPATIENT
Start: 2022-11-30 | End: 2022-12-01 | Stop reason: HOSPADM

## 2022-11-30 RX ORDER — METFORMIN HYDROCHLORIDE 500 MG/1
500 TABLET, EXTENDED RELEASE ORAL 2 TIMES DAILY WITH MEALS
COMMUNITY

## 2022-11-30 RX ORDER — POTASSIUM CHLORIDE 7.45 MG/ML
10 INJECTION INTRAVENOUS PRN
Status: DISCONTINUED | OUTPATIENT
Start: 2022-11-30 | End: 2022-12-01 | Stop reason: HOSPADM

## 2022-11-30 RX ORDER — ASPIRIN 81 MG/1
30 TABLET, CHEWABLE ORAL NIGHTLY PRN
COMMUNITY

## 2022-11-30 RX ORDER — LANOLIN ALCOHOL/MO/W.PET/CERES
3 CREAM (GRAM) TOPICAL NIGHTLY PRN
Status: DISCONTINUED | OUTPATIENT
Start: 2022-11-30 | End: 2022-12-01 | Stop reason: HOSPADM

## 2022-11-30 RX ORDER — ONDANSETRON 2 MG/ML
4 INJECTION INTRAMUSCULAR; INTRAVENOUS EVERY 6 HOURS PRN
Status: DISCONTINUED | OUTPATIENT
Start: 2022-11-30 | End: 2022-12-01 | Stop reason: HOSPADM

## 2022-11-30 RX ADMIN — ATORVASTATIN CALCIUM 80 MG: 80 TABLET, FILM COATED ORAL at 02:34

## 2022-11-30 RX ADMIN — Medication 3 MG: at 21:57

## 2022-11-30 RX ADMIN — SODIUM CHLORIDE, PRESERVATIVE FREE 10 ML: 5 INJECTION INTRAVENOUS at 20:42

## 2022-11-30 RX ADMIN — METOPROLOL TARTRATE 25 MG: 25 TABLET, FILM COATED ORAL at 02:34

## 2022-11-30 RX ADMIN — ATORVASTATIN CALCIUM 80 MG: 80 TABLET, FILM COATED ORAL at 20:30

## 2022-11-30 RX ADMIN — SODIUM CHLORIDE, PRESERVATIVE FREE 10 ML: 5 INJECTION INTRAVENOUS at 08:32

## 2022-11-30 RX ADMIN — METOPROLOL TARTRATE 25 MG: 25 TABLET, FILM COATED ORAL at 10:34

## 2022-11-30 RX ADMIN — PANTOPRAZOLE SODIUM 40 MG: 40 TABLET, DELAYED RELEASE ORAL at 06:29

## 2022-11-30 RX ADMIN — DILTIAZEM HYDROCHLORIDE 120 MG: 60 CAPSULE, EXTENDED RELEASE ORAL at 17:51

## 2022-11-30 RX ADMIN — HEPARIN SODIUM 14 UNITS/KG/HR: 10000 INJECTION, SOLUTION INTRAVENOUS at 13:33

## 2022-11-30 RX ADMIN — Medication 1000 UNITS: at 10:34

## 2022-11-30 ASSESSMENT — PAIN SCALES - GENERAL
PAINLEVEL_OUTOF10: 0
PAINLEVEL_OUTOF10: 0

## 2022-11-30 NOTE — PROGRESS NOTES
0300 Pts. Anti Xa was 1.10, per protocol, delayed heparin for 1 hour and restarted it at 0400 @ 14.98 units per hour/12.9 mL per hour.

## 2022-11-30 NOTE — H&P
[facility]  Office: [address], DO, Estephania Matamoros, DO, Laura Cody, DO, Kevin Leonard, DO, Estela Mei MD, Danelle Gutierrez MD, Gloria Amador MD, Ella Bear MD,  Diane Guillaume MD, Keren Kevin MD, Chelsey Salcedo DO, Chris Robbins MD,  Berta Arzola MD, Hugo Herr MD, Carmenza Pavon, DO, Khai Noriega MD, James Silva MD, Antoine Monteiro, DO, Danielle Camilo MD, Jeancarlos Moya MD, Meena Bess MD, Sariah Méndez MD, Fany Mcarthur DO, Rashid Friedman MD, Nora Arredondo MD, Jacob Marinelli CNP,  Los Killian, CNP, Zaheer Marsh, CNP, Anastasia Marie, CNP,  Seymour Canales, DNP, Terri Sheldon, CNP, Mami Cadena, CNP, Alexandra Daniel, CNP, Kiki Hwang, CNP, Nicola Pompa, CNP, Lavell Tyson PASRINIVASAN, Ke Montoya, CNS, Ervin Lowery, DNP, John Ceron, CNP, Guanakito Magaña, CNP, Marco Pearl, CNP         [facility]   [address]    HISTORY AND PHYSICAL EXAMINATION            Date:   11/30/2022  Patient name:  Diana Palmer  Date of admission:  11/30/2022 12:56 AM  MRN:   [id number]  Account:  [de-identified]  YOB: 1965  PCP:    Subhash Robledo  Room:   2042/2042-01  Code Status:    Full Code    Chief Complaint:     Chest pain   History Obtained From:     patient    History of Present Illness:     Diana Palmer is a 49 y.o. Non- / non  male who presents with Chest pain and is admitted to the hospital for the management of NSTEMI (non-ST elevated myocardial infarction) ([facility] [address] Patient reports Saturday morning he started having chest pain that wrapped around his entire chest and of his neck and jaw for about 4 hours that resolved with rest.  He describes it as squeezing in nature. He states since then he has had intermittent chest pain to his left lower chest that feels like a \"twinge\" he denies any previous cardiac history or any associated symptoms.     ED evaluation revealed elevated troponins of 214 and 206  EKG shows normal sinus rhythm with nonspecific ST and T wave abnormality. Chest x-ray is unremarkable    Cardiac echo on 11/16/2021 revealed normal global left systolic function, EF of 55% mild diastolic dysfunction  Past Medical History:     Past Medical History:   Diagnosis Date    Arthritis     Depression     Reflux     Sleep apnea         Past Surgical History:     Past Surgical History:   Procedure Laterality Date    COLONOSCOPY  01/29/2018    -hemorrhoids    FOOT SURGERY Right     toenail removed great toe    KNEE ARTHROSCOPY Left 4/27/15    ND COLON CA SCRN NOT HI RSK IND N/A 1/29/2018    COLONOSCOPY performed by Mark Guerra MD at [address]          Medications Prior to Admission:     Prior to Admission medications    Medication Sig Start Date End Date Taking? Authorizing Provider   buPROPion (WELLBUTRIN) 100 MG tablet Take 300 mg by mouth every morning  Patient not taking: Reported on 11/30/2022    Historical Provider, MD   vitamin D (CHOLECALCIFEROL) 1000 UNIT TABS tablet Take 1,000 Units by mouth daily    Historical Provider, MD   omeprazole (PRILOSEC) 10 MG delayed release capsule Take 10 mg by mouth every morning  Patient not taking: Reported on 11/30/2022    Historical Provider, MD        Allergies:     Patient has no known allergies. Social History:     Tobacco:    reports that he quit smoking about 4 years ago. His smoking use included cigarettes. He has been exposed to tobacco smoke. He uses smokeless tobacco.  Alcohol:      reports no history of alcohol use. Drug Use:  reports that he does not currently use drugs. Family History:     History reviewed. No pertinent family history. Review of Systems:     Positive and Negative as described in HPI.     CONSTITUTIONAL:  negative for fevers, chills, sweats, fatigue, weight loss  HEENT:  negative for vision, hearing changes, runny nose, throat pain  RESPIRATORY:  negative for shortness of breath, cough, congestion, wheezing  CARDIOVASCULAR: Positive for chest pain, negative for palpitations  GASTROINTESTINAL:  negative for nausea, vomiting, diarrhea, constipation, change in bowel habits, abdominal pain   GENITOURINARY:  negative for difficulty of urination, burning with urination, frequency   INTEGUMENT:  negative for rash, skin lesions, easy bruising   HEMATOLOGIC/LYMPHATIC:  negative for swelling/edema   ALLERGIC/IMMUNOLOGIC:  negative for urticaria , itching  ENDOCRINE:  negative increase in drinking, increase in urination, hot or cold intolerance  MUSCULOSKELETAL:  negative joint pains, muscle aches, swelling of joints  NEUROLOGICAL:  negative for headaches, dizziness, lightheadedness, numbness, pain, tingling extremities  BEHAVIOR/PSYCH:  negative for depression, anxiety    Physical Exam:   BP (!) 123/93   Pulse 62   Resp 14   Temp (24hrs), Av.4 °F (36.9 °C), Min:98.4 °F (36.9 °C), Max:98.4 °F (36.9 °C)    No results for input(s): POCGLU in the last 72 hours. No intake or output data in the 24 hours ending 22 0210    General Appearance:  alert, well appearing, and in no acute distress  Mental status: oriented to person, place, and time  Head:  normocephalic, atraumatic  Eye: no icterus, redness, pupils equal and reactive, extraocular eye movements intact, conjunctiva clear  Ear: normal external ear, no discharge, hearing intact  Nose:  no drainage noted  Mouth: mucous membranes moist  Neck: supple, no carotid bruits, thyroid not palpable  Lungs: Bilateral equal air entry, clear to auscultation, no wheezing, rales or rhonchi, normal effort  Cardiovascular: normal rate, regular rhythm, no murmur, gallop, rub.   Abdomen: Soft, nontender, nondistended, normal bowel sounds, no hepatomegaly or splenomegaly  Neurologic: There are no new focal motor or sensory deficits, normal muscle tone and bulk, no abnormal sensation, normal speech, cranial nerves II through XII grossly intact  Skin: No gross lesions, rashes, bruising or bleeding on exposed skin area  Extremities:  peripheral pulses palpable, no pedal edema or calf pain with palpation  Psych: normal affect     Investigations:      Laboratory Testing:  Recent Results (from the past 24 hour(s))   EKG 12 Lead    Collection Time: 11/29/22  1:34 PM   Result Value Ref Range    Ventricular Rate 85 BPM    Atrial Rate 85 BPM    P-R Interval 146 ms    QRS Duration 78 ms    Q-T Interval 326 ms    QTc Calculation (Bazett) 387 ms    P Axis 59 degrees    R Axis 49 degrees    T Axis 88 degrees   Basic Metabolic Panel    Collection Time: 11/29/22  1:40 PM   Result Value Ref Range    Glucose 105 (H) 70 - 99 mg/dL    BUN 16 6 - 20 mg/dL    Creatinine 1.03 0.70 - 1.20 mg/dL    Est, Glom Filt Rate >60 >60 mL/min/1.73m2    Bun/Cre Ratio 16 9 - 20    Calcium 9.0 8.6 - 10.4 mg/dL    Sodium 135 135 - 144 mmol/L    Potassium 4.1 3.7 - 5.3 mmol/L    Chloride 100 98 - 107 mmol/L    CO2 24 20 - 31 mmol/L    Anion Gap 11 9 - 17 mmol/L   CBC with Auto Differential    Collection Time: 11/29/22  1:40 PM   Result Value Ref Range    WBC 6.1 3.5 - 11.3 k/uL    RBC 4.49 4.21 - 5.77 m/uL    Hemoglobin 12.6 (L) 13.0 - 17.0 g/dL    Hematocrit 38.2 (L) 40.7 - 50.3 %    MCV 85.1 82.6 - 102.9 fL    MCH 28.1 25.2 - 33.5 pg    MCHC 33.0 28.4 - 34.8 g/dL    RDW 14.3 11.8 - 14.4 %    Platelets 073 484 - 645 k/uL    MPV 9.3 8.1 - 13.5 fL    NRBC Automated 0.0 0.0 per 100 WBC    Seg Neutrophils 54 36 - 65 %    Lymphocytes 31 24 - 43 %    Monocytes 11 3 - 12 %    Eosinophils % 2 1 - 4 %    Basophils 2 0 - 2 %    Immature Granulocytes 0 0 %    Segs Absolute 3.30 1.50 - 8.10 k/uL    Absolute Lymph # 1.88 1.10 - 3.70 k/uL    Absolute Mono # 0.64 0.10 - 1.20 k/uL    Absolute Eos # 0.12 0.00 - 0.44 k/uL    Basophils Absolute 0.12 0.00 - 0.20 k/uL    Absolute Immature Granulocyte <0.03 0.00 - 0.30 k/uL   Troponin    Collection Time: 11/29/22  1:40 PM Result Value Ref Range    Troponin, High Sensitivity 214 (HH) 0 - 22 ng/L   Troponin    Collection Time: 11/29/22  3:11 PM   Result Value Ref Range    Troponin, High Sensitivity 206 (HH) 0 - 22 ng/L   APTT    Collection Time: 11/29/22  6:20 PM   Result Value Ref Range    PTT 28.7 26.8 - 34.8 sec       Imaging/Diagnostics:  XR CHEST PORTABLE    Result Date: 11/29/2022  No significant abnormalities detected. CTA CHEST ABDOMEN PELVIS W CONTRAST    Result Date: 11/29/2022  1. No evidence for aortic aneurysm or dissection. 2. No acute infective inflammatory process. Assessment :      Hospital Problems             Last Modified POA    * (Principal) NSTEMI (non-ST elevated myocardial infarction) ([facility] [address] 11/30/2022 Yes    Depression 11/30/2022 Yes    Sleep apnea 11/30/2022 Yes    Arthritis 11/30/2022 Yes       Plan:     Patient status inpatient in the Progressive Unit/Step down    Resume select home meds  Monitor labs, replace electrolytes as needed  Trend cardiac enzymes  Cardiology consult  Heparin drip per protocol  GI prophylaxis  Telemetry monitoring  Lipid panel  Clear liquid diet  Cardiac echo  Supplemental oxygen as needed  Monitor and control blood pressure  Plan discussed with patient and staff    Consultations:   [address]    Patient is admitted as inpatient status because of co-morbidities listed above, severity of signs and symptoms as outlined, requirement for current medical therapies and most importantly because of direct risk to patient if care not provided in a hospital setting. Expected length of stay > 48 hours. On this date 11/30/2022 I have spent 47 minutes reviewing previous notes, test results and face to face with the patient discussing the diagnosis and importance of compliance with the treatment plan as well as documenting on the day of the visit. At least 50% of the time documented was spent with the patient to provide counseling and/or coordination of care.    Mónica Caesar Villalobos - NIKOLAY  11/30/2022  2:10 AM    Copy sent to Dr. Victor M Redding

## 2022-11-30 NOTE — PLAN OF CARE
Problem: Discharge Planning  Goal: Discharge to home or other facility with appropriate resources  Outcome: Progressing  Flowsheets  Taken 11/30/2022 0213  Discharge to home or other facility with appropriate resources: Identify barriers to discharge with patient and caregiver  Taken 11/30/2022 0200  Discharge to home or other facility with appropriate resources: Identify barriers to discharge with patient and caregiver  Taken 11/30/2022 0134  Discharge to home or other facility with appropriate resources: Identify barriers to discharge with patient and caregiver     Problem: Safety - Adult  Goal: Free from fall injury  Outcome: Progressing     Problem: ABCDS Injury Assessment  Goal: Absence of physical injury  Outcome: Progressing     Problem: Pain  Goal: Verbalizes/displays adequate comfort level or baseline comfort level  Outcome: Progressing

## 2022-11-30 NOTE — PROCEDURES
Procedure Type:      Diagnostic procedure: Coronary angiography     Complications:    - No complication      Conclusions      Procedure Summary      Nonobstructive CAD with mid LAD bridging. Recommendations      Medical therapy as needed.       Signature      ----------------------------------------------------------------   Electronically signed by Scott Douglass(Performing Physician)   on 11/30/2022 16:08

## 2022-11-30 NOTE — PROGRESS NOTES
Transitions of Care Pharmacy Service   Medication Review    The patient's list of current home medications has been reviewed. Source(s) of information: spoke to patient and sure scripts     Based on information provided by the above source(s), I have updated the patient's home med list as described below. I changed or updated the following medications on the patient's home medication list:  Discontinued buPROPion (WELLBUTRIN) 100 MG tablet     Added metFORMIN (GLUCOPHAGE-XR) 500 MG extended release tablet  diphenhydrAMINE HCl (HM Z-SLEEP) 50 MG/30ML LIQD     Adjusted   omeprazole (PRILOSEC) 10 MG delayed release capsule     Other Notes None            Please feel free to call me with any questions about this encounter. Thank you. This note will be reviewed and co-signed by the Transitions of Care Pharmacist. The pharmacist will review inpatient orders and contact the physician about any discrepancies.     Beth Montero, pharmacy technician  Transitions of Care Pharmacy Service  Phone:  [phone]  Fax: [phone]      Electronically signed by Beth Montero on 11/30/2022 at 1:52 PM         Prior to Admission medications    Medication Sig   metFORMIN (GLUCOPHAGE-XR) 500 MG extended release tablet Take 500 mg by mouth 2 times daily (with meals)   diphenhydrAMINE HCl (HM Z-SLEEP) 50 MG/30ML LIQD Take 30 mLs by mouth nightly as needed   vitamin D (CHOLECALCIFEROL) 1000 UNIT TABS tablet Take 1,000 Units by mouth daily   omeprazole (PRILOSEC) 10 MG delayed release capsule Take 10 mg by mouth every morning

## 2022-11-30 NOTE — CONSULTS
[facility] Cardiology Cardiology    Consult                        Today's Date: 11/30/2022  Patient Name: Monica Espinoza  Date of admission: 11/30/2022 12:56 AM  Patient's age: 49 y.o., 1965  Admission Dx: NSTEMI (non-ST elevated myocardial infarction) ([facility] [address] [I21.4]    Reason for Consult:  Cardiac evaluation    Requesting Physician: Pura Kenyon MD    CHIEF COMPLAINT:  chest pain     History Obtained From:  patient, electronic medical record    HISTORY OF PRESENT ILLNESS:      The patient is a 49 y.o.  male with no previous cardiac history who is admitted to the hospital for chest pain. Saturday morning he started having squeezing chest pain radiating to neck and jaw area  for about 4 hours that relieved with rest but continued intermittently . In ED troponins  were 214 and 206. Cardiology was consulted for elevated troponin and chest    Past Medical History:   has a past medical history of Arthritis, Depression, Reflux, and Sleep apnea. Past Surgical History:   has a past surgical history that includes Foot surgery (Right); Vasectomy; Knee arthroscopy (Left, 4/27/15); [place] tooth extraction; pr colon ca scrn not hi rsk ind (N/A, 1/29/2018); and Colonoscopy (01/29/2018). Home Medications:    Prior to Admission medications    Medication Sig Start Date End Date Taking? Authorizing Provider   buPROPion (WELLBUTRIN) 100 MG tablet Take 300 mg by mouth every morning  Patient not taking: Reported on 11/30/2022    Historical Provider, MD   vitamin D (CHOLECALCIFEROL) 1000 UNIT TABS tablet Take 1,000 Units by mouth daily    Historical Provider, MD   omeprazole (PRILOSEC) 10 MG delayed release capsule Take 10 mg by mouth every morning  Patient not taking: Reported on 11/30/2022    Historical Provider, MD       Allergies:  Patient has no known allergies. Social History:   reports that he quit smoking about 4 years ago. His smoking use included cigarettes. He has been exposed to tobacco smoke.  He uses smokeless tobacco. He reports that he does not currently use drugs. He reports that he does not drink alcohol. Family History: family history is not on file. No h/o sudden cardiac death. No for premature CAD    REVIEW OF SYSTEMS:    Constitutional: there has been no unanticipated weight loss. There's been No change in energy level, No change in activity level. Eyes: No visual changes or diplopia. No scleral icterus. ENT: No Headaches, hearing loss or vertigo. No mouth sores or sore throat. Cardiovascular: see above  Respiratory: see above  Gastrointestinal: No abdominal pain, appetite loss, blood in stools. Genitourinary: No dysuria, trouble voiding, or hematuria. Musculoskeletal:  No gait disturbance, No weakness or joint complaints. Integumentary: No rash or pruritis. Neurological: No headache or diplopia. No tingling  Psychiatric: No anxiety, or depression. Endocrine: No temperature intolerance. Hematologic/Lymphatic: No abnormal bruising or bleeding, blood clots or swollen lymph nodes. Allergic/Immunologic: No nasal congestion or hives. PHYSICAL EXAM:      /83   Pulse 67   Temp 98.1 °F (36.7 °C) (Temporal)   Resp 16   Ht 6' 3\" (1.905 m)   Wt 174 lb 0.8 oz (78.9 kg)   SpO2 96%   BMI 21.75 kg/m²    Constitutional and General Appearance: alert, cooperative, no distress and appears stated age  HEENT: PERRL, no cervical lymphadenopathy. No masses palpable. Normal oral mucosa  Respiratory:  Normal excursion and expansion without use of accessory muscles  Resp Auscultation: Good respiratory effort. No for increased work of breathing. On auscultation: clear to auscultation bilaterally  Cardiovascular:  Heart tones are crisp and normal. regular S1 and S2.  Jugular venous pulsation Normal  The carotid upstroke is normal in amplitude and contour without delay or bruit   Abdomen:   soft  Bowel sounds present  Extremities:   No edema  Neurological:  Alert and oriented.       DATA:    Diagnostics: EKG:   EKG shows normal sinus rhythm with nonspecific ST and T wave abnormality  ECHO:   Cardiac echo on 11/16/2021 revealed normal global left systolic function, EF of 24% mild diastolic dysfunction. Labs:     CBC:   Recent Labs     11/29/22  1340   WBC 6.1   HGB 12.6*   HCT 38.2*        BMP:   Recent Labs     11/29/22  1340      K 4.1   CO2 24   BUN 16   CREATININE 1.03   LABGLOM >60   GLUCOSE 105*     BNP: No results for input(s): BNP in the last 72 hours. PT/INR: No results for input(s): PROTIME, INR in the last 72 hours. APTT:  Recent Labs     11/29/22  1820   APTT 28.7     CARDIAC ENZYMES:No results for input(s): CKTOTAL, CKMB, CKMBINDEX, TROPONINI in the last 72 hours. FASTING LIPID PANEL:No results found for: HDL, LDLDIRECT, LDLCALC, TRIG  LIVER PROFILE:No results for input(s): AST, ALT, LABALBU in the last 72 hours. Patient Active Problem List   Diagnosis    NSTEMI (non-ST elevated myocardial infarction) ([facility] [address]    Depression    Sleep apnea    Arthritis     IMPRESSION & RECOMMENDATIONS:    Chest pain elevated troponin but flat  Case discussed with Dr. Brian Patel , Plan for cardiac cath . I have discussed risks (including but not limited to vascular injury, infection, hematoma, contrast induced kidney dysfunction, CVA and MI), benefits, alternatives in detail. All questions answered. Patient agrees to proceed. Continue ASA , BB, statin , nitro  and Heparin  drip  Keep K>4, Mg>2       Discussed with patient and Nurse    Danny Herrmannt, [address] [facility] Card[facility] Consult    Thank you for allowing me to participate in the care of this patient, please do not hesitate to call if you have any questions. Precious Langley, [address] [facility]  [email or web address]  [phone]

## 2022-12-01 VITALS
SYSTOLIC BLOOD PRESSURE: 112 MMHG | RESPIRATION RATE: 16 BRPM | HEIGHT: 75 IN | BODY MASS INDEX: 22.39 KG/M2 | DIASTOLIC BLOOD PRESSURE: 85 MMHG | TEMPERATURE: 98.3 F | HEART RATE: 58 BPM | OXYGEN SATURATION: 97 % | WEIGHT: 180.1 LBS

## 2022-12-01 LAB
ALBUMIN SERPL-MCNC: 3.5 G/DL (ref 3.5–5.2)
ALP BLD-CCNC: 57 U/L (ref 40–129)
ALT SERPL-CCNC: 26 U/L (ref 5–41)
ANION GAP SERPL CALCULATED.3IONS-SCNC: 10 MMOL/L (ref 9–17)
ANTI-XA UNFRAC HEPARIN: <0.1 IU/L (ref 0.3–0.7)
AST SERPL-CCNC: 20 U/L
BILIRUB SERPL-MCNC: 0.4 MG/DL (ref 0.3–1.2)
BUN BLDV-MCNC: 18 MG/DL (ref 6–20)
BUN/CREAT BLD: 17 (ref 9–20)
CALCIUM SERPL-MCNC: 8.8 MG/DL (ref 8.6–10.4)
CHLORIDE BLD-SCNC: 104 MMOL/L (ref 98–107)
CO2: 22 MMOL/L (ref 20–31)
CREAT SERPL-MCNC: 1.08 MG/DL (ref 0.7–1.2)
EKG ATRIAL RATE: 55 BPM
EKG P AXIS: 60 DEGREES
EKG P-R INTERVAL: 182 MS
EKG Q-T INTERVAL: 398 MS
EKG QRS DURATION: 76 MS
EKG QTC CALCULATION (BAZETT): 380 MS
EKG R AXIS: 45 DEGREES
EKG T AXIS: 54 DEGREES
EKG VENTRICULAR RATE: 55 BPM
GFR SERPL CREATININE-BSD FRML MDRD: >60 ML/MIN/1.73M2
GLUCOSE BLD-MCNC: 95 MG/DL (ref 70–99)
GLUCOSE BLD-MCNC: 95 MG/DL (ref 75–110)
HCT VFR BLD CALC: 36.7 % (ref 40.7–50.3)
HEMOGLOBIN: 11.5 G/DL (ref 13–17)
MAGNESIUM: 2 MG/DL (ref 1.6–2.6)
MCH RBC QN AUTO: 27 PG (ref 25.2–33.5)
MCHC RBC AUTO-ENTMCNC: 31.3 G/DL (ref 28.4–34.8)
MCV RBC AUTO: 86.2 FL (ref 82.6–102.9)
NRBC AUTOMATED: 0 PER 100 WBC
PDW BLD-RTO: 14.4 % (ref 11.8–14.4)
PLATELET # BLD: 290 K/UL (ref 138–453)
PMV BLD AUTO: 9.3 FL (ref 8.1–13.5)
POTASSIUM SERPL-SCNC: 4.3 MMOL/L (ref 3.7–5.3)
PRO-BNP: 21 PG/ML
RBC # BLD: 4.26 M/UL (ref 4.21–5.77)
SODIUM BLD-SCNC: 136 MMOL/L (ref 135–144)
TOTAL PROTEIN: 6.2 G/DL (ref 6.4–8.3)
WBC # BLD: 6.4 K/UL (ref 3.5–11.3)

## 2022-12-01 PROCEDURE — 36415 COLL VENOUS BLD VENIPUNCTURE: CPT

## 2022-12-01 PROCEDURE — 83735 ASSAY OF MAGNESIUM: CPT

## 2022-12-01 PROCEDURE — 6370000000 HC RX 637 (ALT 250 FOR IP): Performed by: INTERNAL MEDICINE

## 2022-12-01 PROCEDURE — 85027 COMPLETE CBC AUTOMATED: CPT

## 2022-12-01 PROCEDURE — 82947 ASSAY GLUCOSE BLOOD QUANT: CPT

## 2022-12-01 PROCEDURE — 93005 ELECTROCARDIOGRAM TRACING: CPT | Performed by: NURSE PRACTITIONER

## 2022-12-01 PROCEDURE — 85520 HEPARIN ASSAY: CPT

## 2022-12-01 PROCEDURE — 83880 ASSAY OF NATRIURETIC PEPTIDE: CPT

## 2022-12-01 PROCEDURE — 80053 COMPREHEN METABOLIC PANEL: CPT

## 2022-12-01 PROCEDURE — 6370000000 HC RX 637 (ALT 250 FOR IP): Performed by: NURSE PRACTITIONER

## 2022-12-01 RX ORDER — DILTIAZEM HYDROCHLORIDE 120 MG/1
120 CAPSULE, EXTENDED RELEASE ORAL DAILY
Qty: 60 CAPSULE | Refills: 3 | Status: SHIPPED | OUTPATIENT
Start: 2022-12-01

## 2022-12-01 RX ORDER — ASPIRIN 81 MG/1
81 TABLET ORAL DAILY
Qty: 90 TABLET | Refills: 1 | Status: SHIPPED | OUTPATIENT
Start: 2022-12-01

## 2022-12-01 RX ORDER — OMEPRAZOLE 20 MG/1
20 CAPSULE, DELAYED RELEASE ORAL EVERY MORNING
Qty: 30 CAPSULE | Refills: 0 | Status: SHIPPED | OUTPATIENT
Start: 2022-12-01

## 2022-12-01 RX ADMIN — ASPIRIN 81 MG CHEWABLE TABLET 81 MG: 81 TABLET CHEWABLE at 11:18

## 2022-12-01 RX ADMIN — DILTIAZEM HYDROCHLORIDE 120 MG: 60 CAPSULE, EXTENDED RELEASE ORAL at 11:18

## 2022-12-01 RX ADMIN — PANTOPRAZOLE SODIUM 40 MG: 40 TABLET, DELAYED RELEASE ORAL at 06:26

## 2022-12-01 RX ADMIN — Medication 1000 UNITS: at 11:18

## 2022-12-01 ASSESSMENT — ENCOUNTER SYMPTOMS
RHINORRHEA: 0
COUGH: 0
VOICE CHANGE: 0
WHEEZING: 0
BACK PAIN: 0
CHEST TIGHTNESS: 0
VOMITING: 0
NAUSEA: 0
DIARRHEA: 0
SINUS PRESSURE: 0
ABDOMINAL PAIN: 0
SHORTNESS OF BREATH: 0
CONSTIPATION: 0
CHOKING: 0

## 2022-12-01 NOTE — CARE COORDINATION
12/01/22 0848   Service Assessment   Patient Orientation Alert and Oriented;Person;Place;Situation;Self   Cognition Alert   History Provided By Patient   Primary Caregiver Self   Support Systems Spouse/Significant Other   Patient's Healthcare Decision Maker is: Legal Next of Kin   PCP Verified by CM Yes   Last Visit to PCP Within last year   Prior Functional Level Independent in ADLs/IADLs   Current Functional Level Independent in ADLs/IADLs   Can patient return to prior living arrangement Yes   Ability to make needs known: Good   Family able to assist with home care needs: Yes   Would you like for me to discuss the discharge plan with any other family members/significant others, and if so, who? No   Social/Functional History   Lives With Spouse   Type of [address] Two level   Bathroom Shower/Tub Tub/Shower unit   Bathroom Toilet Standard   Bathroom Accessibility Accessible   Receives Help From Family   ADL Assistance Independent   Homemaking Assistance Independent   Ambulation Assistance Independent   Transfer Assistance Independent   Active  Yes   Mode of Transportation Car   Occupation Full time employment   Discharge Planning   Living Arrangements Spouse/Significant Other   Current Services Prior To Admission None   Potential Assistance Needed N/A   DME Ordered? No   Potential Assistance Purchasing Medications No   Type of Home Care Services None   Patient expects to be discharged to: House   Follow Up Appointment: Best Day/Time  Monday AM   One/Two Story Residence Two story   Lift Chair Available No   History of falls? 0   Services At/After Discharge   Services At/After Discharge None   [employer] Information Provided?  No   Mode of Transport at Discharge Other (see comment)  (spouse)   Confirm Follow Up Transport Family   Condition of Participation: Discharge Planning   The Plan for Transition of Care is related to the following treatment goals: home with spouse independently   The Patient and/or Patient Representative was provided with a Choice of Provider? Patient   The Patient and/Or Patient Representative agree with the Discharge Plan? Yes   Freedom of Choice list was provided with basic dialogue that supports the patient's individualized plan of care/goals, treatment preferences, and shares the quality data associated with the providers? Yes   Plan is to discharge home independently. Declines any skilled needs. Pharmacy is [facility] in [facility]. Continue to follow.

## 2022-12-01 NOTE — PLAN OF CARE
Problem: Discharge Planning  Goal: Discharge to home or other facility with appropriate resources  12/1/2022 1156 by Minerva Hoang RN  Outcome: Adequate for Discharge  12/1/2022 1156 by Minerva Hoang RN  Outcome: Progressing  Flowsheets (Taken 12/1/2022 0800)  Discharge to home or other facility with appropriate resources:   Identify barriers to discharge with patient and caregiver   Arrange for needed discharge resources and transportation as appropriate   Identify discharge learning needs (meds, wound care, etc)   Refer to discharge planning if patient needs post-hospital services based on physician order or complex needs related to functional status, cognitive ability or social support system     Problem: Safety - Adult  Goal: Free from fall injury  12/1/2022 1156 by Minerva Hoang RN  Outcome: Adequate for Discharge  12/1/2022 1156 by Minerva Hoang RN  Outcome: Progressing     Problem: ABCDS Injury Assessment  Goal: Absence of physical injury  12/1/2022 1156 by Minevra Hoang RN  Outcome: Adequate for Discharge  12/1/2022 1156 by Minerva Hoang RN  Outcome: Progressing     Problem: Pain  Goal: Verbalizes/displays adequate comfort level or baseline comfort level  12/1/2022 1156 by Minerva Hoang RN  Outcome: Adequate for Discharge  12/1/2022 1156 by Minerva Hoang RN  Outcome: Progressing

## 2022-12-01 NOTE — PLAN OF CARE
Problem: Discharge Planning  Goal: Discharge to home or other facility with appropriate resources  Outcome: Progressing  Flowsheets (Taken 12/1/2022 0800)  Discharge to home or other facility with appropriate resources:   Identify barriers to discharge with patient and caregiver   Arrange for needed discharge resources and transportation as appropriate   Identify discharge learning needs (meds, wound care, etc)   Refer to discharge planning if patient needs post-hospital services based on physician order or complex needs related to functional status, cognitive ability or social support system     Problem: Safety - Adult  Goal: Free from fall injury  Outcome: Progressing     Problem: ABCDS Injury Assessment  Goal: Absence of physical injury  Outcome: Progressing     Problem: Pain  Goal: Verbalizes/displays adequate comfort level or baseline comfort level  Outcome: Progressing

## 2022-12-01 NOTE — PROGRESS NOTES
[facility]  Progress Note                   Date:   12/1/2022  Patient name: Dolores Stephenson  Date of admission:  11/30/2022 12:56 AM  MRN:   [id number]  YOB: 1965  PCP: Arthur Robledo    Reason for Admission: NSTEMI (non-ST elevated myocardial infarction) ([facility] [address] [I21.4]    Subjective:       Clinical Changes /Abnormalities:Patient seen and examined in street  cloth  ready to be discharged . Denies chest pain or shortness of breath. Review of Systems    Medications:   Scheduled Meds:   pantoprazole  40 mg Oral QAM AC    Vitamin D  1,000 Units Oral Daily    sodium chloride flush  5-40 mL IntraVENous 2 times per day    atorvastatin  80 mg Oral Nightly    aspirin  81 mg Oral Daily    insulin lispro  0-4 Units SubCUTAneous TID WC    insulin lispro  0-4 Units SubCUTAneous Nightly    dilTIAZem  120 mg Oral Daily     Continuous Infusions:   sodium chloride      dextrose      sodium chloride Stopped (12/01/22 0218)     CBC:   Recent Labs     11/29/22  1340 12/01/22  0514   WBC 6.1 6.4   HGB 12.6* 11.5*    290     BMP:    Recent Labs     11/29/22  1340 12/01/22  0514    136   K 4.1 4.3    104   CO2 24 22   BUN 16 18   CREATININE 1.03 1.08   GLUCOSE 105* 95     Hepatic:  Recent Labs     12/01/22  0514   AST 20   ALT 26   BILITOT 0.4   ALKPHOS 57     Troponin:   Recent Labs     11/29/22  1511 11/30/22  0145 11/30/22  0405   TROPHS 206* 209* 212*     BNP: No results for input(s): BNP in the last 72 hours. Lipids:   Recent Labs     11/30/22  0405   CHOL 188   HDL 63     INR: No results for input(s): INR in the last 72 hours. DATA:    Diagnostics:    EKG:   EKG shows normal sinus rhythm with nonspecific ST and T wave abnormality  ECHO:   Cardiac echo on 11/16/2021 revealed normal global left systolic function, EF of 20% mild diastolic dysfunction.      Cath Procedure  Procedure Type:      Diagnostic procedure: Coronary angiography     Complications:    - No complication Conclusions      Procedure Summary      Nonobstructive CAD with mid LAD bridging. Recommendations      Medical therapy as needed. Signature      ----------------------------------------------------------------   Electronically signed by Scott Douglass(Performing Physician)   on 11/30/2022 16:08   ----------------------------------------------------------------      Angiographic Findings      Cardiac Arteries and Lesion Findings     LMCA: Mild irregularities 10-20%. LAD: 40% mid stenosis with mid LAd bridging. LCx: Mild irregularities 10-20%. RCA: Normal 0% stenosis. Coronary Tree      Dominance: Right       Objective:   Vitals: /85   Pulse 58   Temp 98.3 °F (36.8 °C) (Temporal)   Resp 16   Ht 6' 3\" (1.905 m)   Wt 180 lb 1.6 oz (81.7 kg)   SpO2 97%   BMI 22.51 kg/m²   General appearance: alert and cooperative with exam  HEENT: Head: Normocephalic, no lesions, without obvious abnormality.   Neck:no JVD, trachea midline, no adenopathy  Lungs: Clear to auscultation  Heart: Regular rate and rhythm, s1/s2 auscultated, no murmurs  Abdomen: soft, non-tender, bowel sounds active  Extremities: no edema  Neurologic: not done        Assessment / Acute Cardiac Problems:   Chest pain elevated troponin but flat could be type 2     Patient Active Problem List:     NSTEMI (non-ST elevated myocardial infarction) ([facility] [address]     Depression     Sleep apnea     Arthritis     Prediabetes      Plan of Treatment:   S/p cardiac cath as above - right radial no hematoma or bleeding noted - continue asa , statin and cardizem   Okay to discharge from cardiology standpoint cardiology is signing off    Electronically signed by BJORN Hastings NP on 12/1/2022 at 12:09 PM  [address]  [phone]

## 2022-12-01 NOTE — FLOWSHEET NOTE
12/01/22 9393   Treatment Team Notification   Reason for Communication Review case   Team Member Name [facility]   Treatment Team Role Advanced Practice Nurse   Method of Communication Page   Response Waiting for response   Notification Time [phone]     Writer paged Cardiology [Dr. Hendricks Ast to inquire when patient would be discharged. Patient shared with writer that he was told that he would be discharged \"first thing in the morning\". Writer was informed by answering service that NIKOLAY Dunne is covering [place] for Dr. Carballo [facility]. [phone]: NP [facility] returned call. Same information was shared with NP (regarding patient's expectation of an early discharge). Writer was corrected by NP and informed that the patient will be discharged \"When we round\". NIKOLAY Dunne could not provide a time when Cardiology will round.

## 2022-12-01 NOTE — DISCHARGE SUMMARY
[facility]  Office: [phone]  Sammylula Leonard DO, Beth Guadalupe MD, Johann Riedel MD, Camron Knutson MD, Miryam Camejo MD, Lilly French MD, Kennedi Smith MD, Sonny Strange MD, Cary Fuentes MD, Antonina Patel MD, Rhonda Nava DO, Valerie Hill MD, Rosemary Chawla MD, Juliana Melgar DO, Martha Garcia MD,  Isaias Pederson MD, Sergio Arriaga MD, [facility], [facility] CNP, Dutton Drew CNP, Bobetta Carota CNS, Loyd Shadi CNP, Leia Estrada CNP, Court Palomino CNP, Mónica Carreno CNP, Gina Ryan CNP, Michael BISWASC, Kasandra Pesa CNP, Tarry Ripper CNP, Rin Patwade CNP, Rosy Neli CNP, [facility] CNP, Richard X[address]      Discharge Summary     Patient ID: Lidya Byrd  :  1965   MRN: [id number]     ACCOUNT:  [de-identified]   Patient Location :   Patient's PCP: Mich Robledo  Admit Date: 2022   Discharge Date: 2022     Length of Stay: 1  Code Status:  Prior  Admitting Physician: Camron Knutson MD  Discharge Physician: Camron Knutson MD     Active Discharge Diagnosis :     Primary Problem  NSTEMI (non-ST elevated myocardial infarction) [facility]    Diagnosis Date Noted    Depression [F32.A] 2022     Priority: Medium    Sleep apnea [G47.30] 2022     Priority: Medium    Arthritis [M19.90] 2022     Priority: Medium    Prediabetes [R73.03] 2022     Priority: Medium    NSTEMI (non-ST elevated myocardial infarction) ([email or web address] [I21.4] 2022     Priority: Medium       Admission Condition:  fair     Discharged Condition: stable    Hospital Stay:     Hospital Course:  Lidya Byrd is a 49 y.o. male who was admitted for the management of   NSTEMI (non-ST elevated myocardial infarction) ([address] , presented to ER with Chest Pain.    Patient was transferred from [facility] emergency room where he presented with intermittent chest pain for 4 days. Patient reported decrease exercise tolerance for last few months. Patient is former smoker and alcohol user and quit about 40 years before. He also had sleep apnea which is better after lifestyle changes. He denies any history of hypertension, hyperlipidemia. He has underlying history of prediabetes. Evaluation emergency room showed elevated troponin. Patient had stress test 1 year before that was negative and echo showing preserved ejection fraction. Patient denies any family history of heart disease. Patient had cardiac cath on 11/30/22 and was found to have non obstructive CAD with mid LAD bridging. He was discharged on aspirin and cardizem. Significant therapeutic interventions:   NSTEMI due to non obstructive CAD -  treated with Heparin infusion. Discharged on  aspirin, Cardizem, Lopressor. Cardiology consulted and had normal cardiac cath. Prediabetes  - on metformin . Hold medication for 48 Hrs   H/o sleep apnea not using CPAP after lifestyle changes.        Significant Diagnostic Studies:   Labs / Micro:/Radiology  Recent Labs     12/01/22  0514 11/29/22  1340   WBC 6.4 6.1   HGB 11.5* 12.6*   HCT 36.7* 38.2*   MCV 86.2 85.1    326     Labs Renal Latest Ref Rng & Units 12/1/2022 11/29/2022 4/21/2015   BUN 6 - 20 mg/dL 18 16 18   Cr 0.70 - 1.20 mg/dL 1.08 1.03 1.10   K 3.7 - 5.3 mmol/L 4.3 4.1 4.3   Na 135 - 144 mmol/L 136 135 140     Lab Results   Component Value Date    ALT 26 12/01/2022    AST 20 12/01/2022    ALKPHOS 57 12/01/2022    BILITOT 0.4 12/01/2022     No results found for: TSHFT4, TSH  No results found for: HAV, HEPAIGM, HEPBIGM, HEPBCAB, HBEAG, HEPCAB  No results found for: VOL, APPEARANCE, COLORU, LABSPEC, LABPH, LEUKBLD, NITRU, GLUCOSEU, KETUA, UROBILINOGEN, KETUA, UROBILINOGEN, BILIRUBINUR, OCBU  No results found for: LABA1C  No results found for: EAG  No results found for: INR, PROTIME    XR CHEST PORTABLE    Result Date: 11/29/2022  No significant abnormalities detected. CTA CHEST ABDOMEN PELVIS W CONTRAST    Result Date: 11/29/2022  1. No evidence for aortic aneurysm or dissection. 2. No acute infective inflammatory process. Consultations:    Consults:     Final Specialist Recommendations/Findings:   IP CONSULT TO CARDIOLOGY      The patient was seen and examined on day of discharge and this discharge summary is in conjunction with any daily progress note from day of discharge. Discharge plan:     Disposition: Home    Physician Follow Up:     Horacio Valdemar [address] [facility] [address]  [phone]    Schedule an appointment as soon as possible for a visit      Chaparrita Walters MD  [facility] [address]  [phone]    Go on 12/20/2022  [place] Office - Meet with Dr. Li [address]       Requiring Further Evaluation/Follow Up POST HOSPITALIZATION/Incidental Findings: follow up with cardiology in clinic. Diet: cardiac diet    Activity: As tolerated. Instructions to Patient: follow up with PCP and Cardiology .      Discharge Medications:      Medication List        START taking these medications      aspirin EC 81 MG EC tablet  Take 1 tablet by mouth daily     dilTIAZem 120 MG extended release capsule  Commonly known as: CARDIZEM 12 HR  Take 1 capsule by mouth daily            CHANGE how you take these medications      omeprazole 20 MG delayed release capsule  Commonly known as: PRILOSEC  Take 1 capsule by mouth every morning  What changed:   medication strength  how much to take            CONTINUE taking these medications      HM Z-Sleep 50 MG/30ML Liqd  Generic drug: diphenhydrAMINE HCl     metFORMIN 500 MG extended release tablet  Commonly known as: GLUCOPHAGE-XR     vitamin D 1000 UNIT Tabs tablet  Commonly known as: CHOLECALCIFEROL               Where to Get Your Medications        These medications were sent to [address] [address]      Phone: [phone]   aspirin EC 81 MG EC tablet  dilTIAZem 120 MG extended release capsule  omeprazole 20 MG delayed release capsule         Time Spent on discharge is  25 mins in patient examination, evaluation, counseling as well as medication reconciliation, prescriptions for required medications, discharge plan and follow up. Electronically signed by   Gloria Amador MD  12/1/2022        Thank you Dr. Reanna Mathew for the opportunity to be involved in this patient's care.

## 2022-12-01 NOTE — PROGRESS NOTES
Patient returned to room 2042 for recovery from post cardiac catheterization. Right radial puncture site with armboard and TR Band in place with a reported 12 cc air inflation and site with no drainage, swelling, redness, or hematoma apparent. Patient denies any numbness or tingling to area, right hand warm, with good color and nailbed janie with brisk refill. Patient alert and oriented and denies any pain, including specifically any chest pain. Reviewed recovery instructions/restrictions (to right arm/wrist) with patient and he verbalized understanding. Call light within reach.

## 2022-12-05 NOTE — PROGRESS NOTES
Physician Progress Note      PATIENT:               Vidal Cronin  CSN #:                  [id number]  :                       1965  ADMIT DATE:       2022 12:56 AM  DISCH DATE:        2022 11:30 AM  RESPONDING  PROVIDER #:        Toni Wooten MD          QUERY TEXT:    Patient admitted with \"NSTEMI due to non obstructive CAD. \"   Cardiololgy   note states, \"Chest pain elevated troponin but flat could be type 2. \"  If   possible, please document in the discharge summary if you are evaluating   and/or treating any of the following: The medical record reflects the following:  Risk Factors: non-obstructive CAD, former smoker, age 49  Clinical Indicators: admitted with \"NSTEMI due to non obstructive CAD. \"     Cardiololgy note states, \"Chest pain elevated troponin but flat could be type   2\"  Treatment: Cardiology consult, heart cath, EKG, ECHO, tele  Options provided:  -- NSTEMI confirmed  -- Type 2 MI due to, please specify cause. -- Other - I will add my own diagnosis  -- Disagree - Not applicable / Not valid  -- Disagree - Clinically unable to determine / Unknown  -- Refer to Clinical Documentation Reviewer    PROVIDER RESPONSE TEXT:    This patient has a Type 2 MI due to    Query created by: Martha Leiva on 2022 9:47 AM      Electronically signed by:   Toni Wooten MD 2022 2:00 PM

## 2023-06-20 PROBLEM — Q24.5 MYOCARDIAL BRIDGE: Status: ACTIVE | Noted: 2023-06-20

## 2023-06-20 PROBLEM — I25.10 NON-OCCLUSIVE CORONARY ARTERY DISEASE: Status: ACTIVE | Noted: 2023-06-20

## 2024-03-12 ENCOUNTER — HOSPITAL ENCOUNTER (OUTPATIENT)
Age: 59
Discharge: HOME | End: 2024-03-12
Payer: COMMERCIAL

## 2024-03-12 ENCOUNTER — OFFICE VISIT (OUTPATIENT)
Dept: CARDIOLOGY | Age: 59
End: 2024-03-12
Payer: COMMERCIAL

## 2024-03-12 VITALS
HEIGHT: 75 IN | BODY MASS INDEX: 24.1 KG/M2 | OXYGEN SATURATION: 98 % | RESPIRATION RATE: 18 BRPM | HEART RATE: 83 BPM | WEIGHT: 193.8 LBS | DIASTOLIC BLOOD PRESSURE: 76 MMHG | SYSTOLIC BLOOD PRESSURE: 127 MMHG

## 2024-03-12 DIAGNOSIS — I38 DIASTOLIC MURMUR: ICD-10-CM

## 2024-03-12 DIAGNOSIS — I25.10 ASHD (ARTERIOSCLEROTIC HEART DISEASE): Primary | ICD-10-CM

## 2024-03-12 DIAGNOSIS — I21.4 NSTEMI (NON-ST ELEVATED MYOCARDIAL INFARCTION) (HCC): ICD-10-CM

## 2024-03-12 DIAGNOSIS — I25.10 ASHD (ARTERIOSCLEROTIC HEART DISEASE): ICD-10-CM

## 2024-03-12 DIAGNOSIS — E78.2 MIXED HYPERLIPIDEMIA: ICD-10-CM

## 2024-03-12 DIAGNOSIS — R06.02 SOB (SHORTNESS OF BREATH): ICD-10-CM

## 2024-03-12 PROCEDURE — 83550 IRON BINDING TEST: CPT

## 2024-03-12 PROCEDURE — 93000 ELECTROCARDIOGRAM COMPLETE: CPT | Performed by: FAMILY MEDICINE

## 2024-03-12 PROCEDURE — 99204 OFFICE O/P NEW MOD 45 MIN: CPT | Performed by: FAMILY MEDICINE

## 2024-03-12 PROCEDURE — 36415 COLL VENOUS BLD VENIPUNCTURE: CPT

## 2024-03-12 PROCEDURE — 82728 ASSAY OF FERRITIN: CPT

## 2024-03-12 PROCEDURE — 83540 ASSAY OF IRON: CPT

## 2024-03-12 RX ORDER — M-VIT,TX,IRON,MINS/CALC/FOLIC 27MG-0.4MG
1 TABLET ORAL DAILY
COMMUNITY

## 2024-03-12 NOTE — PROGRESS NOTES
Return in about 1 year (around 3/12/2025). However, I would be happy to see him sooner should the need arise.    Sincerely,  Ambrosio Madden MD, MS, [email or web address].  [facility]    [address] [place] [id number]  Phone: [phone], Fax: [phone]     I believe that the risk of significant morbidity and mortality related to the patient's current medical conditions are: Intermediate. Approximately 60 minutes were spent during prep work, discussion and exam of the patient, and follow up documentation and all of their questions were answered.     The documentation recorded by the scribe, accurately and completely reflects the services I personally performed and the decisions made by me. Ambrosio Madden MD, MS, [email or web address]. March 12, 2024

## 2024-03-13 ENCOUNTER — TELEPHONE (OUTPATIENT)
Dept: CARDIOLOGY | Age: 59
End: 2024-03-13

## 2024-03-13 LAB
FERRITIN SERPL-MCNC: 49 NG/ML (ref 30–400)
IRON SATN MFR SERPL: 22 % (ref 20–55)
IRON SERPL-MCNC: 68 UG/DL (ref 61–157)
TIBC SERPL-MCNC: 316 UG/DL (ref 250–450)
UNSATURATED IRON BINDING CAPACITY: 248 UG/DL (ref 112–347)

## 2024-03-13 NOTE — TELEPHONE ENCOUNTER
----- Message from Ambrosio Madden MD sent at 3/13/2024 12:37 PM EDT -----  Let Patient know iron levels are all back to normal at least right now but would discuss long term treatment with Vickie Robledo.

## 2024-04-15 ENCOUNTER — HOSPITAL ENCOUNTER (OUTPATIENT)
Age: 59
Discharge: HOME | End: 2024-04-17
Attending: FAMILY MEDICINE
Payer: COMMERCIAL

## 2024-04-15 VITALS
DIASTOLIC BLOOD PRESSURE: 76 MMHG | BODY MASS INDEX: 24.09 KG/M2 | SYSTOLIC BLOOD PRESSURE: 127 MMHG | WEIGHT: 193.78 LBS | HEIGHT: 75 IN

## 2024-04-15 DIAGNOSIS — E78.2 MIXED HYPERLIPIDEMIA: ICD-10-CM

## 2024-04-15 DIAGNOSIS — I21.4 NSTEMI (NON-ST ELEVATED MYOCARDIAL INFARCTION) (HCC): ICD-10-CM

## 2024-04-15 DIAGNOSIS — R06.02 SOB (SHORTNESS OF BREATH): ICD-10-CM

## 2024-04-15 DIAGNOSIS — I25.10 ASHD (ARTERIOSCLEROTIC HEART DISEASE): ICD-10-CM

## 2024-04-15 DIAGNOSIS — I38 DIASTOLIC MURMUR: ICD-10-CM

## 2024-04-15 LAB
ECHO AO ROOT DIAM: 2.2 CM
ECHO AO ROOT INDEX: 1.01 CM/M2
ECHO AO SINUS VALSALVA DIAM: 3.3 CM
ECHO AO SINUS VALSALVA INDEX: 1.52 CM/M2
ECHO AO ST JNCT DIAM: 2.8 CM
ECHO AV CUSP MM: 1.8 CM
ECHO AV MEAN GRADIENT: 4 MMHG
ECHO AV MEAN VELOCITY: 0.9 M/S
ECHO AV PEAK GRADIENT: 7 MMHG
ECHO AV PEAK VELOCITY: 1.3 M/S
ECHO AV VELOCITY RATIO: 0.77
ECHO AV VTI: 25.2 CM
ECHO BSA: 2.16 M2
ECHO EST RA PRESSURE: 3 MMHG
ECHO IVC PROX: 1.5 CM
ECHO LA AREA 2C: 20.4 CM2
ECHO LA AREA 4C: 22 CM2
ECHO LA MAJOR AXIS: 6.4 CM
ECHO LA MINOR AXIS: 5.2 CM
ECHO LA VOL BP: 69 ML (ref 18–58)
ECHO LA VOL MOD A2C: 65 ML (ref 18–58)
ECHO LA VOL MOD A4C: 59 ML (ref 18–58)
ECHO LA VOL/BSA BIPLANE: 32 ML/M2 (ref 16–34)
ECHO LA VOLUME INDEX MOD A2C: 30 ML/M2 (ref 16–34)
ECHO LA VOLUME INDEX MOD A4C: 27 ML/M2 (ref 16–34)
ECHO LV E' LATERAL VELOCITY: 12 CM/S
ECHO LV EDV A2C: 90 ML
ECHO LV EDV A4C: 105 ML
ECHO LV EDV INDEX A4C: 48 ML/M2
ECHO LV EDV NDEX A2C: 41 ML/M2
ECHO LV EJECTION FRACTION A2C: 63 %
ECHO LV EJECTION FRACTION A4C: 60 %
ECHO LV EJECTION FRACTION BIPLANE: 61 % (ref 55–100)
ECHO LV ESV A2C: 33 ML
ECHO LV ESV A4C: 42 ML
ECHO LV ESV INDEX A2C: 15 ML/M2
ECHO LV ESV INDEX A4C: 19 ML/M2
ECHO LV FRACTIONAL SHORTENING: 31 % (ref 28–44)
ECHO LV INTERNAL DIMENSION DIASTOLE INDEX: 2.07 CM/M2
ECHO LV INTERNAL DIMENSION DIASTOLIC: 4.5 CM (ref 4.2–5.9)
ECHO LV INTERNAL DIMENSION SYSTOLIC INDEX: 1.43 CM/M2
ECHO LV INTERNAL DIMENSION SYSTOLIC: 3.1 CM
ECHO LV IVSD: 1 CM (ref 0.6–1)
ECHO LV MASS 2D: 153.3 G (ref 88–224)
ECHO LV MASS INDEX 2D: 70.6 G/M2 (ref 49–115)
ECHO LV POSTERIOR WALL DIASTOLIC: 1 CM (ref 0.6–1)
ECHO LV RELATIVE WALL THICKNESS RATIO: 0.44
ECHO LVOT AV VTI INDEX: 0.76
ECHO LVOT MEAN GRADIENT: 2 MMHG
ECHO LVOT PEAK GRADIENT: 4 MMHG
ECHO LVOT PEAK VELOCITY: 1 M/S
ECHO LVOT VTI: 19.1 CM
ECHO MV A VELOCITY: 0.52 M/S
ECHO MV E DECELERATION TIME (DT): 190 MS
ECHO MV E VELOCITY: 0.69 M/S
ECHO MV E/A RATIO: 1.33
ECHO MV E/E' LATERAL: 5.75
ECHO PULMONARY ARTERY END DIASTOLIC PRESSURE: 5 MMHG
ECHO PV MAX VELOCITY: 1.1 M/S
ECHO PV PEAK GRADIENT: 5 MMHG
ECHO PV REGURGITANT MAX VELOCITY: 1.2 M/S
ECHO RIGHT VENTRICULAR SYSTOLIC PRESSURE (RVSP): 31 MMHG
ECHO TV REGURGITANT MAX VELOCITY: 2.65 M/S
ECHO TV REGURGITANT PEAK GRADIENT: 28 MMHG

## 2024-04-15 PROCEDURE — 93306 TTE W/DOPPLER COMPLETE: CPT | Performed by: INTERNAL MEDICINE

## 2024-04-15 PROCEDURE — 93306 TTE W/DOPPLER COMPLETE: CPT

## 2024-04-16 ENCOUNTER — TELEPHONE (OUTPATIENT)
Dept: CARDIOLOGY | Age: 59
End: 2024-04-16

## 2024-04-16 NOTE — TELEPHONE ENCOUNTER
----- Message from Ambrosio Madden MD sent at 4/16/2024 11:06 AM EDT -----  Let Mr. Trujillo know their test result was ok. Will discuss at next visit. Thanks.

## 2024-05-20 RX ORDER — ASPIRIN 81 MG/1
81 TABLET ORAL DAILY
COMMUNITY
End: 2024-05-20 | Stop reason: SDUPTHER

## 2024-05-20 RX ORDER — ASPIRIN 81 MG/1
81 TABLET ORAL DAILY
Qty: 90 TABLET | Refills: 3 | Status: SHIPPED | OUTPATIENT
Start: 2024-05-20

## 2024-07-03 ENCOUNTER — HOSPITAL ENCOUNTER (OUTPATIENT)
Dept: GENERAL RADIOLOGY | Age: 59
Discharge: HOME | End: 2024-07-05
Payer: COMMERCIAL

## 2024-07-03 ENCOUNTER — HOSPITAL ENCOUNTER (OUTPATIENT)
Age: 59
Discharge: HOME | End: 2024-07-05
Payer: COMMERCIAL

## 2024-07-03 DIAGNOSIS — M54.2 NECK PAIN: ICD-10-CM

## 2024-07-03 PROCEDURE — 72050 X-RAY EXAM NECK SPINE 4/5VWS: CPT

## 2024-09-16 ENCOUNTER — HOSPITAL ENCOUNTER (OUTPATIENT)
Dept: MRI IMAGING | Age: 59
Discharge: HOME | End: 2024-09-18
Attending: ORTHOPAEDIC SURGERY
Payer: COMMERCIAL

## 2024-09-16 DIAGNOSIS — M25.561 RIGHT KNEE PAIN, UNSPECIFIED CHRONICITY: ICD-10-CM

## 2024-09-16 DIAGNOSIS — M25.562 LEFT KNEE PAIN, UNSPECIFIED CHRONICITY: ICD-10-CM

## 2024-09-16 PROCEDURE — 73721 MRI JNT OF LWR EXTRE W/O DYE: CPT

## 2024-10-02 DIAGNOSIS — I25.10 NON-OCCLUSIVE CORONARY ARTERY DISEASE: ICD-10-CM

## 2024-10-02 RX ORDER — ATORVASTATIN CALCIUM 40 MG/1
40 TABLET, FILM COATED ORAL DAILY
Qty: 90 TABLET | Refills: 3 | Status: SHIPPED | OUTPATIENT
Start: 2024-10-02

## 2024-10-28 NOTE — PATIENT INSTRUCTIONS
SURVEY:    You may be receiving a survey from [employer] regarding your visit today.    Please complete the survey to enable us to provide the highest quality of care to you and your family.    If you cannot score us a very good on any question, please call the office to discuss how we could have made your experience a very good one.    Thank you.    
no deformity, pain or tenderness. no restriction of movement/supple/trachea midline

## 2024-12-03 ENCOUNTER — HOSPITAL ENCOUNTER (OUTPATIENT)
Age: 59
Discharge: HOME | End: 2024-12-03
Payer: COMMERCIAL

## 2024-12-03 LAB
EKG ATRIAL RATE: 81 BPM
EKG P AXIS: 73 DEGREES
EKG P-R INTERVAL: 168 MS
EKG Q-T INTERVAL: 324 MS
EKG QRS DURATION: 80 MS
EKG QTC CALCULATION (BAZETT): 376 MS
EKG R AXIS: 47 DEGREES
EKG T AXIS: 86 DEGREES
EKG VENTRICULAR RATE: 81 BPM

## 2024-12-03 PROCEDURE — 93005 ELECTROCARDIOGRAM TRACING: CPT | Performed by: ORTHOPAEDIC SURGERY

## 2024-12-03 PROCEDURE — 93010 ELECTROCARDIOGRAM REPORT: CPT | Performed by: INTERNAL MEDICINE

## 2025-05-29 DIAGNOSIS — E78.2 MIXED HYPERLIPIDEMIA: ICD-10-CM

## 2025-05-29 DIAGNOSIS — R06.02 SOB (SHORTNESS OF BREATH): ICD-10-CM

## 2025-05-29 DIAGNOSIS — I21.4 NSTEMI (NON-ST ELEVATED MYOCARDIAL INFARCTION) (HCC): ICD-10-CM

## 2025-05-29 DIAGNOSIS — I25.10 NON-OCCLUSIVE CORONARY ARTERY DISEASE: Primary | ICD-10-CM

## 2025-05-29 DIAGNOSIS — I25.10 ASHD (ARTERIOSCLEROTIC HEART DISEASE): ICD-10-CM

## 2025-05-29 RX ORDER — ASPIRIN 81 MG/1
81 TABLET ORAL DAILY
Qty: 90 TABLET | Refills: 3 | Status: SHIPPED | OUTPATIENT
Start: 2025-05-29

## 2025-06-23 ENCOUNTER — OFFICE VISIT (OUTPATIENT)
Dept: CARDIOLOGY | Age: 60
End: 2025-06-23
Payer: COMMERCIAL

## 2025-06-23 VITALS
SYSTOLIC BLOOD PRESSURE: 120 MMHG | RESPIRATION RATE: 18 BRPM | WEIGHT: 193.4 LBS | OXYGEN SATURATION: 98 % | BODY MASS INDEX: 24.05 KG/M2 | HEART RATE: 92 BPM | DIASTOLIC BLOOD PRESSURE: 72 MMHG | HEIGHT: 75 IN

## 2025-06-23 DIAGNOSIS — R06.02 SHORTNESS OF BREATH: ICD-10-CM

## 2025-06-23 DIAGNOSIS — I25.10 ARTERIOSCLEROTIC HEART DISEASE (ASHD): ICD-10-CM

## 2025-06-23 DIAGNOSIS — R94.31 ABNORMAL ELECTROCARDIOGRAPHY: Primary | ICD-10-CM

## 2025-06-23 DIAGNOSIS — R07.9 CHEST PAIN, UNSPECIFIED TYPE: ICD-10-CM

## 2025-06-23 PROCEDURE — 99214 OFFICE O/P EST MOD 30 MIN: CPT | Performed by: FAMILY MEDICINE

## 2025-06-23 PROCEDURE — 93000 ELECTROCARDIOGRAM COMPLETE: CPT | Performed by: FAMILY MEDICINE

## 2025-06-23 NOTE — PROGRESS NOTES
Patient: Henrique Trujillo  : 1965  Date of Visit: 2025    REASON FOR VISIT / CONSULTATION: Coronary Artery Disease (/Hx: CAD,NSTEMI. Yearly follow up. Echo completed on 2024. EKG in Dec 2024. //He has been feeling pretty good. Felt a little pinch in his chest before, happened several weeks ago - happened several times in 1 day. //Denies: SOB, Lightheaded/dizziness, Palpitations. )    History of Present Illness:        Dear BJORN Vergara NP,     I had the pleasure of seeing Henrique Trujillo in my office today. Mr. Trujillo is a 59 y.o. male with a history of atherosclerotic heart disease. In 2022, he had chest pain that radiated to his jaw and he felt hot. It was a Saturday morning and he was going to go to breakfast with his sister and dad and he started not feeling well so he turned around and went home and went to bed. The pain lasted a couple hours at that time. Then two days later on Monday morning, he called his PCP who advised him to go to the ER and it was found that his Troponin was elevated. He was diagnosed with NSTEMI at that time. Echocardiogram completed on 2024: EF of 60%. Trivial tricuspid regurgitation. Trivial pulmonic insufficiency. He had a cardiac catheterization completed on 2024: Nonobstructive CAD with mid LAD bridging.     Mr. Trujillo is here today to establish care after being referred by Vickie Robledo APRN - NP. He previously followed with Dr. Weathers. He reports he has been feeling fine. He does have some shortness of breath with exertion but this doesn't happen all that often.     He denies having any chest pain, pressure or tightness. He denies having any lightheaded/dizziness or palpitations. No cough, fever or chills. No nausea or vomiting. No abdominal pain, bleeding problems, bowel issues, problems with his medications or any other concerns at this time.      Exercise Tolerance: Mr. Trujillo reports that he has a fairly good exercise

## 2025-07-23 ENCOUNTER — HOSPITAL ENCOUNTER (OUTPATIENT)
Age: 60
Discharge: HOME | End: 2025-07-25
Attending: FAMILY MEDICINE
Payer: COMMERCIAL

## 2025-07-23 ENCOUNTER — HOSPITAL ENCOUNTER (OUTPATIENT)
Dept: NUCLEAR MEDICINE | Age: 60
Discharge: HOME | End: 2025-07-25
Attending: FAMILY MEDICINE
Payer: COMMERCIAL

## 2025-07-23 DIAGNOSIS — R07.9 CHEST PAIN, UNSPECIFIED TYPE: ICD-10-CM

## 2025-07-23 DIAGNOSIS — R94.31 ABNORMAL ELECTROCARDIOGRAPHY: ICD-10-CM

## 2025-07-23 DIAGNOSIS — R06.02 SHORTNESS OF BREATH: ICD-10-CM

## 2025-07-23 PROCEDURE — 93017 CV STRESS TEST TRACING ONLY: CPT

## 2025-07-23 PROCEDURE — A9500 TC99M SESTAMIBI: HCPCS | Performed by: FAMILY MEDICINE

## 2025-07-23 PROCEDURE — 3430000000 HC RX DIAGNOSTIC RADIOPHARMACEUTICAL: Performed by: FAMILY MEDICINE

## 2025-07-23 RX ORDER — TETRAKIS(2-METHOXYISOBUTYLISOCYANIDE)COPPER(I) TETRAFLUOROBORATE 1 MG/ML
30 INJECTION, POWDER, LYOPHILIZED, FOR SOLUTION INTRAVENOUS
Status: COMPLETED | OUTPATIENT
Start: 2025-07-23 | End: 2025-07-23

## 2025-07-23 RX ADMIN — Medication 30 MILLICURIE: at 08:52

## 2025-07-24 ENCOUNTER — HOSPITAL ENCOUNTER (OUTPATIENT)
Dept: NUCLEAR MEDICINE | Age: 60
Discharge: HOME | End: 2025-07-26
Attending: FAMILY MEDICINE
Payer: COMMERCIAL

## 2025-07-24 LAB
NUC STRESS EJECTION FRACTION: 77 %
STRESS BASELINE DIAS BP: 86 MMHG
STRESS BASELINE HR: 64 BPM
STRESS BASELINE ST DEPRESSION: 0 MM
STRESS BASELINE SYS BP: 142 MMHG
STRESS ESTIMATED WORKLOAD: 12.8 METS
STRESS EXERCISE DUR MIN: 10 MIN
STRESS EXERCISE DUR SEC: 39 SEC
STRESS PEAK DIAS BP: 90 MMHG
STRESS PEAK SYS BP: 162 MMHG
STRESS PERCENT HR ACHIEVED: 102 %
STRESS POST PEAK HR: 166 BPM
STRESS RATE PRESSURE PRODUCT: NORMAL BPM*MMHG
STRESS ST DEPRESSION: 2 MM
STRESS TARGET HR: 163 BPM
TID: 0.89

## 2025-07-24 PROCEDURE — 93018 CV STRESS TEST I&R ONLY: CPT | Performed by: INTERNAL MEDICINE

## 2025-07-24 PROCEDURE — 78452 HT MUSCLE IMAGE SPECT MULT: CPT | Performed by: INTERNAL MEDICINE

## 2025-07-24 PROCEDURE — 3430000000 HC RX DIAGNOSTIC RADIOPHARMACEUTICAL: Performed by: FAMILY MEDICINE

## 2025-07-24 PROCEDURE — A9500 TC99M SESTAMIBI: HCPCS | Performed by: FAMILY MEDICINE

## 2025-07-24 PROCEDURE — 93016 CV STRESS TEST SUPVJ ONLY: CPT | Performed by: INTERNAL MEDICINE

## 2025-07-24 RX ORDER — TETRAKIS(2-METHOXYISOBUTYLISOCYANIDE)COPPER(I) TETRAFLUOROBORATE 1 MG/ML
30 INJECTION, POWDER, LYOPHILIZED, FOR SOLUTION INTRAVENOUS
Status: COMPLETED | OUTPATIENT
Start: 2025-07-24 | End: 2025-07-24

## 2025-07-24 RX ADMIN — Medication 30 MILLICURIE: at 13:03

## 2025-07-25 ENCOUNTER — RESULTS FOLLOW-UP (OUTPATIENT)
Dept: CARDIOLOGY | Age: 60
End: 2025-07-25

## 2025-07-25 NOTE — TELEPHONE ENCOUNTER
----- Message from Dr. Ambrosio Madden MD sent at 7/25/2025 11:02 AM EDT -----  Please let Mr. Trujillo know that their recent test results are largely unremarkable and/or relatively normal for them. No further action is needed at this time. Please continue with your current care plan.

## (undated) DEVICE — SOLUTION IV IRRIG POUR BRL 0.9% SODIUM CHL 2F7124

## (undated) DEVICE — MEDI-VAC NON-CONDUCTIVE TUBING7MM X 30.5 (100FT): Brand: CARDINAL HEALTH

## (undated) DEVICE — CANNULA ORAL NSL AD CO2 N INTUB O2 DEL DISP TRU LNK

## (undated) DEVICE — NEEDLE 25GAX5.0MM INJ CARR LOCKE